# Patient Record
Sex: MALE | Race: WHITE | NOT HISPANIC OR LATINO | Employment: OTHER | URBAN - METROPOLITAN AREA
[De-identification: names, ages, dates, MRNs, and addresses within clinical notes are randomized per-mention and may not be internally consistent; named-entity substitution may affect disease eponyms.]

---

## 2018-02-27 ENCOUNTER — OFFICE VISIT CONVERTED (OUTPATIENT)
Dept: INTERNAL MEDICINE | Facility: CLINIC | Age: 58
End: 2018-02-27
Attending: INTERNAL MEDICINE

## 2018-10-30 ENCOUNTER — OFFICE VISIT CONVERTED (OUTPATIENT)
Dept: INTERNAL MEDICINE | Facility: CLINIC | Age: 58
End: 2018-10-30
Attending: INTERNAL MEDICINE

## 2018-10-30 ENCOUNTER — CONVERSION ENCOUNTER (OUTPATIENT)
Dept: INTERNAL MEDICINE | Facility: CLINIC | Age: 58
End: 2018-10-30

## 2019-10-30 ENCOUNTER — HOSPITAL ENCOUNTER (OUTPATIENT)
Dept: OTHER | Facility: HOSPITAL | Age: 59
Discharge: HOME | End: 2019-10-30
Attending: INTERNAL MEDICINE

## 2019-10-30 LAB
ALBUMIN SERPL-MCNC: 4.9 G/DL (ref 3.5–5)
ALBUMIN/GLOB SERPL: 2.2 {RATIO} (ref 1.4–2.6)
ALP SERPL-CCNC: 85 U/L (ref 56–119)
ALT SERPL-CCNC: 16 U/L (ref 10–40)
ANION GAP SERPL CALC-SCNC: 18 MMOL/L (ref 8–19)
AST SERPL-CCNC: 19 U/L (ref 15–50)
BASOPHILS # BLD AUTO: 0.03 10*3/UL (ref 0–0.2)
BASOPHILS NFR BLD AUTO: 0.5 % (ref 0–3)
BILIRUB SERPL-MCNC: 0.66 MG/DL (ref 0.2–1.3)
BUN SERPL-MCNC: 16 MG/DL (ref 5–25)
BUN/CREAT SERPL: 17 {RATIO} (ref 6–20)
CALCIUM SERPL-MCNC: 9.7 MG/DL (ref 8.7–10.4)
CHLORIDE SERPL-SCNC: 103 MMOL/L (ref 99–111)
CHOLEST SERPL-MCNC: 148 MG/DL (ref 107–200)
CHOLEST/HDLC SERPL: 3.4 {RATIO} (ref 3–6)
CONV ABS IMM GRAN: 0.01 10*3/UL (ref 0–0.2)
CONV CO2: 26 MMOL/L (ref 22–32)
CONV IMMATURE GRAN: 0.2 % (ref 0–1.8)
CONV TOTAL PROTEIN: 7.1 G/DL (ref 6.3–8.2)
CREAT UR-MCNC: 0.93 MG/DL (ref 0.7–1.2)
DEPRECATED RDW RBC AUTO: 37.9 FL (ref 35.1–43.9)
EOSINOPHIL # BLD AUTO: 0.2 10*3/UL (ref 0–0.7)
EOSINOPHIL # BLD AUTO: 3.3 % (ref 0–7)
ERYTHROCYTE [DISTWIDTH] IN BLOOD BY AUTOMATED COUNT: 11.6 % (ref 11.6–14.4)
GFR SERPLBLD BASED ON 1.73 SQ M-ARVRAT: >60 ML/MIN/{1.73_M2}
GLOBULIN UR ELPH-MCNC: 2.2 G/DL (ref 2–3.5)
GLUCOSE SERPL-MCNC: 101 MG/DL (ref 70–99)
HCT VFR BLD AUTO: 46 % (ref 42–52)
HDLC SERPL-MCNC: 43 MG/DL (ref 40–60)
HGB BLD-MCNC: 16.2 G/DL (ref 14–18)
LDLC SERPL CALC-MCNC: 82 MG/DL (ref 70–100)
LYMPHOCYTES # BLD AUTO: 2.57 10*3/UL (ref 1–5)
LYMPHOCYTES NFR BLD AUTO: 42.2 % (ref 20–45)
MCH RBC QN AUTO: 31.6 PG (ref 27–31)
MCHC RBC AUTO-ENTMCNC: 35.2 G/DL (ref 33–37)
MCV RBC AUTO: 89.8 FL (ref 80–96)
MONOCYTES # BLD AUTO: 0.54 10*3/UL (ref 0.2–1.2)
MONOCYTES NFR BLD AUTO: 8.9 % (ref 3–10)
NEUTROPHILS # BLD AUTO: 2.74 10*3/UL (ref 2–8)
NEUTROPHILS NFR BLD AUTO: 44.9 % (ref 30–85)
NRBC CBCN: 0 % (ref 0–0.7)
OSMOLALITY SERPL CALC.SUM OF ELEC: 295 MOSM/KG (ref 273–304)
PLATELET # BLD AUTO: 134 10*3/UL (ref 130–400)
PMV BLD AUTO: 9.5 FL (ref 9.4–12.4)
POTASSIUM SERPL-SCNC: 4.5 MMOL/L (ref 3.5–5.3)
RBC # BLD AUTO: 5.12 10*6/UL (ref 4.7–6.1)
SODIUM SERPL-SCNC: 142 MMOL/L (ref 135–147)
TRIGL SERPL-MCNC: 114 MG/DL (ref 40–150)
VLDLC SERPL-MCNC: 23 MG/DL (ref 5–37)
WBC # BLD AUTO: 6.09 10*3/UL (ref 4.8–10.8)

## 2019-10-31 ENCOUNTER — HOSPITAL ENCOUNTER (OUTPATIENT)
Dept: OTHER | Facility: HOSPITAL | Age: 59
Discharge: HOME | End: 2019-10-31
Attending: INTERNAL MEDICINE

## 2019-10-31 ENCOUNTER — OFFICE VISIT CONVERTED (OUTPATIENT)
Dept: INTERNAL MEDICINE | Facility: CLINIC | Age: 59
End: 2019-10-31
Attending: INTERNAL MEDICINE

## 2020-02-15 ENCOUNTER — HOSPITAL ENCOUNTER (OUTPATIENT)
Dept: OTHER | Facility: HOSPITAL | Age: 60
Discharge: HOME | End: 2020-02-15
Attending: INTERNAL MEDICINE

## 2020-02-15 LAB
ALBUMIN SERPL-MCNC: 5 G/DL (ref 3.5–5)
ALBUMIN/GLOB SERPL: 2 {RATIO} (ref 1.4–2.6)
ALP SERPL-CCNC: 76 U/L (ref 56–119)
ALT SERPL-CCNC: 16 U/L (ref 10–40)
ANION GAP SERPL CALC-SCNC: 13 MMOL/L (ref 8–19)
AST SERPL-CCNC: 19 U/L (ref 15–50)
BASOPHILS # BLD AUTO: 0.03 10*3/UL (ref 0–0.2)
BASOPHILS NFR BLD AUTO: 0.5 % (ref 0–3)
BILIRUB SERPL-MCNC: 0.83 MG/DL (ref 0.2–1.3)
BUN SERPL-MCNC: 18 MG/DL (ref 5–25)
BUN/CREAT SERPL: 17 {RATIO} (ref 6–20)
CALCIUM SERPL-MCNC: 9.8 MG/DL (ref 8.7–10.4)
CHLORIDE SERPL-SCNC: 100 MMOL/L (ref 99–111)
CONV ABS IMM GRAN: 0.01 10*3/UL (ref 0–0.2)
CONV CO2: 29 MMOL/L (ref 22–32)
CONV IMMATURE GRAN: 0.2 % (ref 0–1.8)
CONV TOTAL PROTEIN: 7.5 G/DL (ref 6.3–8.2)
CREAT UR-MCNC: 1.08 MG/DL (ref 0.7–1.2)
DEPRECATED RDW RBC AUTO: 38.3 FL (ref 35.1–43.9)
EOSINOPHIL # BLD AUTO: 0.23 10*3/UL (ref 0–0.7)
EOSINOPHIL # BLD AUTO: 4.2 % (ref 0–7)
ERYTHROCYTE [DISTWIDTH] IN BLOOD BY AUTOMATED COUNT: 11.8 % (ref 11.6–14.4)
GFR SERPLBLD BASED ON 1.73 SQ M-ARVRAT: >60 ML/MIN/{1.73_M2}
GLOBULIN UR ELPH-MCNC: 2.5 G/DL (ref 2–3.5)
GLUCOSE SERPL-MCNC: 105 MG/DL (ref 70–99)
HCT VFR BLD AUTO: 48.1 % (ref 42–52)
HGB BLD-MCNC: 16.9 G/DL (ref 14–18)
INR PPP: 1.07 (ref 2–3)
LYMPHOCYTES # BLD AUTO: 2.25 10*3/UL (ref 1–5)
LYMPHOCYTES NFR BLD AUTO: 41 % (ref 20–45)
MCH RBC QN AUTO: 31.7 PG (ref 27–31)
MCHC RBC AUTO-ENTMCNC: 35.1 G/DL (ref 33–37)
MCV RBC AUTO: 90.2 FL (ref 80–96)
MONOCYTES # BLD AUTO: 0.4 10*3/UL (ref 0.2–1.2)
MONOCYTES NFR BLD AUTO: 7.3 % (ref 3–10)
NEUTROPHILS # BLD AUTO: 2.57 10*3/UL (ref 2–8)
NEUTROPHILS NFR BLD AUTO: 46.8 % (ref 30–85)
NRBC CBCN: 0 % (ref 0–0.7)
OSMOLALITY SERPL CALC.SUM OF ELEC: 288 MOSM/KG (ref 273–304)
PLATELET # BLD AUTO: 150 10*3/UL (ref 130–400)
PMV BLD AUTO: 9.5 FL (ref 9.4–12.4)
POTASSIUM SERPL-SCNC: 4.3 MMOL/L (ref 3.5–5.3)
PROTHROMBIN TIME: 11.4 S (ref 9.4–12)
RBC # BLD AUTO: 5.33 10*6/UL (ref 4.7–6.1)
SODIUM SERPL-SCNC: 138 MMOL/L (ref 135–147)
WBC # BLD AUTO: 5.49 10*3/UL (ref 4.8–10.8)

## 2020-03-02 ENCOUNTER — OFFICE VISIT CONVERTED (OUTPATIENT)
Dept: GASTROENTEROLOGY | Facility: CLINIC | Age: 60
End: 2020-03-02
Attending: INTERNAL MEDICINE

## 2020-07-02 ENCOUNTER — TELEPHONE CONVERTED (OUTPATIENT)
Dept: UROLOGY | Facility: CLINIC | Age: 60
End: 2020-07-02
Attending: UROLOGY

## 2020-07-06 ENCOUNTER — HOSPITAL ENCOUNTER (OUTPATIENT)
Dept: PERIOP | Facility: HOSPITAL | Age: 60
Setting detail: HOSPITAL OUTPATIENT SURGERY
Discharge: HOME | End: 2020-07-06
Attending: UROLOGY

## 2020-07-14 ENCOUNTER — PROCEDURE VISIT CONVERTED (OUTPATIENT)
Dept: UROLOGY | Facility: CLINIC | Age: 60
End: 2020-07-14
Attending: UROLOGY

## 2020-07-23 LAB
COLOR STONE: NORMAL
COMPN STONE: NORMAL
CONV CA OXALATE DIHYDRATE: 20 %
CONV CA OXALATE MONOHYDRATE: 80 %
CONV CALCULI COMMENT: NORMAL
CONV CALCULI DISCLAIMER: NORMAL
CONV CALCULI NOTE: NORMAL
CONV PHOTO (CALCULI): NORMAL
SIZE STONE: NORMAL MM
WT STONE: 114 MG

## 2020-08-11 ENCOUNTER — HOSPITAL ENCOUNTER (OUTPATIENT)
Dept: ULTRASOUND IMAGING | Facility: HOSPITAL | Age: 60
Discharge: HOME | End: 2020-08-11
Attending: UROLOGY

## 2020-08-19 ENCOUNTER — TELEPHONE CONVERTED (OUTPATIENT)
Dept: UROLOGY | Facility: CLINIC | Age: 60
End: 2020-08-19
Attending: UROLOGY

## 2020-10-02 ENCOUNTER — HOSPITAL ENCOUNTER (OUTPATIENT)
Dept: GASTROENTEROLOGY | Facility: HOSPITAL | Age: 60
Discharge: HOME | End: 2020-10-02
Attending: NURSE PRACTITIONER

## 2020-10-02 ENCOUNTER — HOSPITAL ENCOUNTER (OUTPATIENT)
Dept: ULTRASOUND IMAGING | Facility: HOSPITAL | Age: 60
Discharge: HOME | End: 2020-10-02
Attending: INTERNAL MEDICINE

## 2020-11-10 ENCOUNTER — HOSPITAL ENCOUNTER (OUTPATIENT)
Dept: OTHER | Facility: HOSPITAL | Age: 60
Discharge: HOME | End: 2020-11-10
Attending: INTERNAL MEDICINE

## 2020-11-10 ENCOUNTER — OFFICE VISIT CONVERTED (OUTPATIENT)
Dept: INTERNAL MEDICINE | Facility: CLINIC | Age: 60
End: 2020-11-10
Attending: INTERNAL MEDICINE

## 2020-11-10 LAB
EST. AVERAGE GLUCOSE BLD GHB EST-MCNC: 108 MG/DL
HBA1C MFR BLD: 5.4 % (ref 3.5–5.7)

## 2020-11-11 LAB
ALBUMIN SERPL-MCNC: 5 G/DL (ref 3.5–5)
ALBUMIN/GLOB SERPL: 1.8 {RATIO} (ref 1.4–2.6)
ALP SERPL-CCNC: 76 U/L (ref 56–119)
ALT SERPL-CCNC: 21 U/L (ref 10–40)
ANION GAP SERPL CALC-SCNC: 14 MMOL/L (ref 8–19)
AST SERPL-CCNC: 28 U/L (ref 15–50)
BASOPHILS # BLD AUTO: 0.02 10*3/UL (ref 0–0.2)
BASOPHILS NFR BLD AUTO: 0.4 % (ref 0–3)
BILIRUB SERPL-MCNC: 0.8 MG/DL (ref 0.2–1.3)
BUN SERPL-MCNC: 15 MG/DL (ref 5–25)
BUN/CREAT SERPL: 15 {RATIO} (ref 6–20)
CALCIUM SERPL-MCNC: 9.8 MG/DL (ref 8.7–10.4)
CHLORIDE SERPL-SCNC: 101 MMOL/L (ref 99–111)
CHOLEST SERPL-MCNC: 149 MG/DL (ref 107–200)
CHOLEST/HDLC SERPL: 3.6 {RATIO} (ref 3–6)
CONV ABS IMM GRAN: 0.01 10*3/UL (ref 0–0.2)
CONV CO2: 29 MMOL/L (ref 22–32)
CONV IMMATURE GRAN: 0.2 % (ref 0–1.8)
CONV TOTAL PROTEIN: 7.8 G/DL (ref 6.3–8.2)
CREAT UR-MCNC: 1.02 MG/DL (ref 0.7–1.2)
DEPRECATED RDW RBC AUTO: 38.4 FL (ref 35.1–43.9)
EOSINOPHIL # BLD AUTO: 0.17 10*3/UL (ref 0–0.7)
EOSINOPHIL # BLD AUTO: 3.1 % (ref 0–7)
ERYTHROCYTE [DISTWIDTH] IN BLOOD BY AUTOMATED COUNT: 11.8 % (ref 11.6–14.4)
GFR SERPLBLD BASED ON 1.73 SQ M-ARVRAT: >60 ML/MIN/{1.73_M2}
GLOBULIN UR ELPH-MCNC: 2.8 G/DL (ref 2–3.5)
GLUCOSE SERPL-MCNC: 89 MG/DL (ref 70–99)
HCT VFR BLD AUTO: 46.2 % (ref 42–52)
HDLC SERPL-MCNC: 41 MG/DL (ref 40–60)
HGB BLD-MCNC: 15.9 G/DL (ref 14–18)
LDLC SERPL CALC-MCNC: 67 MG/DL (ref 70–100)
LYMPHOCYTES # BLD AUTO: 2.02 10*3/UL (ref 1–5)
LYMPHOCYTES NFR BLD AUTO: 36.6 % (ref 20–45)
MCH RBC QN AUTO: 30.9 PG (ref 27–31)
MCHC RBC AUTO-ENTMCNC: 34.4 G/DL (ref 33–37)
MCV RBC AUTO: 89.9 FL (ref 80–96)
MONOCYTES # BLD AUTO: 0.4 10*3/UL (ref 0.2–1.2)
MONOCYTES NFR BLD AUTO: 7.2 % (ref 3–10)
NEUTROPHILS # BLD AUTO: 2.9 10*3/UL (ref 2–8)
NEUTROPHILS NFR BLD AUTO: 52.5 % (ref 30–85)
NRBC CBCN: 0 % (ref 0–0.7)
OSMOLALITY SERPL CALC.SUM OF ELEC: 290 MOSM/KG (ref 273–304)
PLATELET # BLD AUTO: 153 10*3/UL (ref 130–400)
PMV BLD AUTO: 10 FL (ref 9.4–12.4)
POTASSIUM SERPL-SCNC: 4.4 MMOL/L (ref 3.5–5.3)
RBC # BLD AUTO: 5.14 10*6/UL (ref 4.7–6.1)
SODIUM SERPL-SCNC: 140 MMOL/L (ref 135–147)
TRIGL SERPL-MCNC: 207 MG/DL (ref 40–150)
TSH SERPL-ACNC: 2.22 M[IU]/L (ref 0.27–4.2)
VLDLC SERPL-MCNC: 41 MG/DL (ref 5–37)
WBC # BLD AUTO: 5.52 10*3/UL (ref 4.8–10.8)

## 2020-11-23 ENCOUNTER — OFFICE VISIT CONVERTED (OUTPATIENT)
Dept: GASTROENTEROLOGY | Facility: CLINIC | Age: 60
End: 2020-11-23
Attending: INTERNAL MEDICINE

## 2021-05-10 NOTE — H&P
History and Physical      Patient Name: Angel Velasco   Patient ID: [id number]   Sex: Male   YOB: 1960    Primary Care Provider: Ana Ho MD   Referring Provider: Ana Ho MD    Visit Date: July 2, 2020    Provider: Laura Sinclair MD   [facility]   Location Address: [address] [place] [address]  [id number]   Location Phone: [phone]          Chief Complaint     Presents with urologic concern    Telephone Visit- presents for evaluation via telephone.   Verbal consent obtained before beginning visit.   The following staff were present during this visit: Larissa Rosario MA  Total time for encounter: 22 minutes       History Of Present Illness  [facility]  Outpatient History and Physical Surgical Orders    Which Facility: [facility] Surgery Date: 07/06/2020 Preadmission Testing Date: 7/2/2020   Patient's Name: Angel Velasco YOB: 1960   Chief complaint/history present illness: Left flank pain, ureteral stone   Current Medication List:   Allergies: doxycycline hyclate and PENICILLINS   Significant past medical: Allergic rhinitis due to allergen, Chronic hepatitis C without hepatic coma, Chronic liver disease, Colitis, Diverticulitis, Elevated blood sugar, Hepatitis, Reye syndrome, and Sinus trouble   Past Surgical History: *Other, Colonoscopy, Liver biopsy, and Scalp Surgery   Examination of heart and lungs: Regular rate, rhythm, no murmur, gallop, rub, Breath sounds normal, no distress, and Abdomen soft, non-tender, BSx4 are positive        60 gentleman presents for further evaluation of ureteral stone after presentation to the ER for severe, acute, stabbing left sided flank pain which radiated to his left groin and abdomen. Patient states that pain was exacerbated by walking and alleviated with rest. Also notes associated nausea; denies fever. CT performed in the ER, 7/2/2020, indicated a left proximal ureteral  calculi 9 mm.   His symptoms were controlled and he was discharged home in anticipation of urologic consultation.  Wife is CRNA at [employer].       Past Medical History  Disease Name Date Onset Notes   Allergic rhinitis due to allergen 10/24/2016 well controlled   Chronic hepatitis C without hepatic coma 10/24/2016 --    Chronic liver disease --  --    Colitis --  --    Diverticulitis --  --    Elevated blood sugar --  --    Hepatitis --  --    Reye syndrome --  --    Sinus trouble --  --          Past Surgical History  Procedure Name Date Notes   *Other  sigmoid colectomy   Colonoscopy 2009, 2012 --    Liver biopsy --  --    Scalp Surgery 1966 excision of lesion         Medication List  Name Date Started Instructions   Flonase Allergy Relief 50 mcg/actuation nasal spray,suspension  inhale 1 spray (50 mcg) in each nostril by intranasal route once daily   ibuprofen 800 mg oral tablet 10/31/2019 take 1 tablet (800 mg) by oral route 3 times per day for 5 days   ketorolac 10 mg oral tablet 07/02/2020 take 1 tablet (10 mg) by oral route every 6 hours as needed not to exceed 40 mg in 24hrs for 30 days   milk thistle oral  --    multivitamin oral tablet  take 1 tablet by oral route daily   Singulair 10 mg oral tablet 10/30/2018 take 1 tablet (10 mg) by oral route once daily in the evening for 90 days   Zyrtec 10 mg oral tablet  take 1 tablet (10 mg) by oral route once daily         Allergy List  Allergen Name Date Reaction Notes   doxycycline hyclate --  --  --    PENICILLINS --  --  --        Allergies Reconciled  Family Medical History  Disease Name Relative/Age Notes   Stroke  Grandparents   Heart Disease Father/   --    Diabetes  Grandparents         Social History  Finding Status Start/Stop Quantity Notes   Alcohol Never --/-- --  --    Tobacco Never --/-- --  --          Review of Systems  · Constitutional  o Denies  o : chills, fever  · Respiratory  o Denies  o : shortness of breath, cough          Results      [facility]      PACS RADIOLOGY REPORT    Patient: ILEANA RENTERIA Acct: #[id number] Report: #[id number]    UNIT #: [id number]  DOS: 20 0738  INSURANCE:BLUE CROSS [facility] ORDER #:[id number]  LOCATION:ER   : 1960    PROVIDERS  ADMITTING:   ATTENDING:   FAMILY:  MARITZA BELTRÁN ORDERING:  AMBER PETERSON   OTHER:  DICTATING:  Chidi Mike MD    REQ #:[id number]   EXAM:ABDPELWO - CT ABDOMEN PELVIS wo CONTRAST  REASON FOR EXAM:  Abdominal Pain  REASON FOR VISIT:  LT FLANK PN DOWN TO GROIN    *******Signed******     PROCEDURE: CT ABDOMEN PELVIS WITHOUT CONTRAST     COMPARISON: None.     INDICATIONS: GENERALIZED LEFT SIDED ABDOMEN AND LEFT FLANK PAIN     TECHNIQUE: CT images were created without intravenous contrast.       PROTOCOL:   Standard imaging protocol performed      RADIATION:   DLP: 388.2mGy*cm    Automated exposure control was utilized to minimize radiation dose.      FINDINGS:   The lung bases are clear bilaterally.     The liver, gallbladder, spleen, pancreas and adrenal glands appear unremarkable.  A 0.2 cm   nonobstructing right renal stone is noted.  The right kidney otherwise appears unremarkable.  On   the left, there is a 0.9 cm stone in the proximal ureter just distal to the UPJ.  This produces   mild hydronephrosis.  There is also a nonobstructing 0.4 cm stone in the lower pole of the kidney.     Several sub cm lymph nodes are noted in the portal triad region and retroperitoneum.  No free fluid   is seen in the abdomen or pelvis.     The prostate appears mildly enlarged measuring 4.8 cm x 3.8 cm.  The urinary bladder and seminal   vesicles appear normal.     Postsurgical changes are noted at the rectosigmoid junction.  Mild colonic diverticulosis is noted.    A surgical clip is noted near the cecum presumably secondary to a previous appendectomy.     Sclerotic foci are noted in the spine, osseous pelvis and proximal femurs,  favored to represent   bone islands.  There is a pars defect on the right at L5 osseous structures otherwise appear   unremarkable.     CONCLUSION:   1. 0.9 cm stone in the proximal left ureter producing mild hydronephrosis  2. Other small nonobstructing bilateral renal stones  3. Mild prostatomegaly  4. Postoperative changes of the rectosigmoid and previous appendectomy            Chidi Mike M.D.         Electronically Signed and Approved By: Chidi Mike M.D. on 7/02/2020 at 8:45            Until signed, this is an unconfirmed preliminary report that may contain  errors and is subject to change.                WURRO:  D:07/02/20 0845         Assessment  · Pre-Surgical Orders     V72.84  · Ureteral stone with hydronephrosis       Hydronephrosis with renal and ureteral calculous obstruction     592.1/N13.2    Problems Reconciled  Plan  · Orders  o General Urology Surgery Order ([facility]) - V72.84, 592.1/N13.2 - 07/06/2020  o Physician Telephone Evaluation, 21-30 minutes (00092) - 592.1/N13.2 - 07/02/2020  · Medications  o Medications have been Reconciled  o Transition of Care or Provider Policy  · Instructions  o Pre-Operative Orders: Sign permit for cystoscopy, left retrograde pyelogram, ureteroscopy, laser lithotripsy, and stent  o Outpatient   o Levaquin 500 mg IV OCTOR.  o RISK AND BENEFITS:  o Possible risks/complications, benefits and alternatives to surgical or invasive procedure have been explained to patient and/or legal guardian.  o Electronically Identified Patient Education Materials Provided Electronically     COVID- test performed in ER, 7/2/20    ER records, labs and imaging reviewed; discussed with patient at length  Unlikely to pass left ureteral proximal stone of 9 mm without surgical intervention.  Recommend hyperhydration, toradol prn, and Flomax  Recommend arranging definitive outpatient stone management via ureteroscopy laser lithotripsy    Counseled to return to ER immeadiately if fever,  intolerance to PO; persistent emesis, or uncontrollable pain    Plan for outpatient definitive stone management via cystoscopy, left retrograde pyelogram, ureteroscopy, laser lithotripsy, and stent placement  Risks, benefits, and alternatives discussed with patient at length including but not limited to bleeding, infection, damage to surrounding structures, pain, and the need for further procedures; notes understanding of this and is agreeable.   Patient also notes understanding that if unable to reach the level of the stone or if significant purulent discharge noted upon initiation of procedure that stent will be placed in definitive stone management will be deferred until the collecting system is optimized.  Will schedule.  All questions addressed                 Electronically Signed by: Laura Sinclair MD -Author on July 2, 2020 01:27:42 PM

## 2021-05-10 NOTE — PROCEDURES
Procedure Note      Patient Name: Angel Velasco   Patient ID: [id number]   Sex: Male   YOB: 1960    Primary Care Provider: Ana Ho MD   Referring Provider: Ana Ho MD    Visit Date: July 14, 2020    Provider: Laura Sinclair MD   [facility]   Location Address: [address] [place] [address]  [id number]   Location Phone: [phone]          Cystoscopy with Stent Removal  Pre-Procedure Diagnosis: Left ureteral stone status post ureteroscopy, laser lithotripsy, and stent   Post-Procedural Diagnosis: Same   Procedure Performed: Cystoscopy with Ureteral Stent Removal   Description of the Procedure:  Angel Velasco was appropriately identified and brought to the cystoscopy suite. A timeout was undertaken documenting the correct patient, site, and procedure. The patient was prepped in a normal sterile fashion. A flexible cystoscope was then introduced into the bladder. The stent was identified and grasped with endoscopic graspers. The entire stent was removed and passed off the field. Patient tolerated the procedure well. There were no intraprocedural complications.           Assessment  · Hydronephrosis     591/N13.30    Problems Reconciled  Plan  · Orders  o Cystoscopy with Stent Removal (50806) - - 07/14/2020  o Renal Ultrasound-bilateral (23751, 12397) - 591/N13.30 - 08/11/2020  · Medications  o Medications have been Reconciled  o Transition of Care or Provider Policy  · Instructions  o Electronically Identified Patient Education Materials Provided Electronically     Tolerated procedure well.  Instructions provided.  Patient to follow-up in 4-6 weeks with renal ultrasound prior  All questions addressed             Electronically Signed by: Laura Sinclair MD -Author on July 14, 2020 06:14:01 PM

## 2021-05-13 NOTE — PROGRESS NOTES
Progress Note      Patient Name: Angel Velasco   Patient ID: [id number]   Sex: Male   YOB: 1960    Primary Care Provider: Ana Ho MD   Referring Provider: Ana Ho MD    Visit Date: November 23, 2020    Provider: Niyah Durán MD   Location[facility]   Location Address: [address] [place] [address], [address]  [place], [place]  [id number]   Location Phone: [phone]          Chief Complaint  · Follow up Cirrhosis      History Of Present Illness     Mr. Velasco is a 60 year old male with history of chronic HCV s/p 24 weeks Harvoni with SVR who presents for f/u.  Doing well since last visit.  Did have episode with nephrolithiasis that require stent.  Now doing better.  No issues with abdominal pain, nausea, vomiting.  Weight stable.       Labs (2/15/2020): WBC 5.4, hemoglobin 16.9, platelets 150, AST 19, ALT 16, alk phos 76, total bili 0.8, INR 1.07.    FibroScan (10/2/20): S0/F0.  Right upper quadrant (10/2/2020): Liver appears normal in size and echotexture, no liver lesions, cholelithiasis.    EGD (10/25/2017): Normal esophagus, small hiatal hernia, normal stomach, normal duodenum.    Colonoscopy (10/25/2017): Moderate pandiverticulosis, evidence of previous colocolonic anastomosis in the sigmoid colon.  1 cm ascending colon polyp removed (sessile serrated), 3 mm sigmoid colon polyp removed (hyperplastic), grade 1 internal hemorrhoids.  Repeat recommended in 5 years.              Past Medical History  Allergic rhinitis due to allergen; Chronic hepatitis C without hepatic coma; Chronic liver disease; Colitis; Diverticulitis; Elevated blood sugar; Hepatitis; Reye syndrome; Sinus trouble         Past Surgical History  *Other; Colonoscopy; Cystoscopy and ureteroscopy, with stent placement; Liver biopsy; Scalp Surgery         Medication List  Flonase Allergy Relief 50 mcg/actuation nasal spray,suspension; ibuprofen 800 mg oral tablet; milk thistle oral;  multivitamin oral tablet; Singulair 10 mg oral tablet; Zyrtec 10 mg oral tablet         Allergy List  doxycycline hyclate; PENICILLINS       Allergies Reconciled  Family Medical History  Stroke; Heart Disease; Diabetes         Social History  Alcohol (Never); Tobacco (Never)         Review of Systems  · Constitutional  o Denies  o : chills, fever  · Cardiovascular  o Denies  o : chest pain  · Respiratory  o Denies  o : shortness of breath  · Gastrointestinal  o Admits  o : see HPI   · Endocrine  o Denies  o : weight gain, weight loss      Vitals  Date Time BP Position Site L\R Cuff Size HR RR TEMP (F) WT  HT  BMI kg/m2 BSA m2 O2 Sat FR L/min FiO2 HC       11/23/2020 11:41 /76 Sitting    64 - R  96.9 179lbs 2oz 6'   24.29 2.03             Physical Examination  · Constitutional  o Appearance  o : Healthy-appearing, awake and alert in no acute distress  · Head and Face  o Head  o : Normocephalic with no worriesome skin lesions  · Eyes  o Vision  o :   § Visual Fields  § : eyes move symmetrical in all directions  o Sclerae  o : sclerae anicteric  · Neck  o Inspection/Palpation  o : Trachea is midline, no adenopathy  · Respiratory  o Respiratory Effort  o : Breathing is unlabored.  o Inspection of Chest  o : normal appearance  o Auscultation of Lungs  o : Chest is clear to auscultation bilaterally.  · Cardiovascular  o Heart  o :   § Auscultation of Heart  § : no murmurs, rubs, or gallops  o Peripheral Vascular System  o :   § Extremities  § : no cyanosis, clubbing or edema;   · Gastrointestinal  o Abdominal Examination  o : Abdomen is soft, nontender to palpation, with normal active bowel sounds, no appreciable hepatosplenomegaly.          Assessment  · Cholelithiasis     574.20/K80.20  · Hepatitis C antibody test positive     795.79/R76.8    Problems Reconciled  Plan  · Medications  o Medications have been Reconciled  o Transition of Care or Provider Policy  · Instructions  o Given most recent RUQ US showing  normal liver and Fibroscan with F0 (no fibrosis), low suspicion for cirrhosis. Would not recommend serial RUQ US every 6 months since no signs of cirrhosis on most recent imaging/diagnostic tests. Previous dx was based on a Fibrosure. Fibroscan would correlate more closely with liver bx results. Pt reports last liver bx about 5 years ago that showed no fibrosis.   o Pt would like to f/u in 1 year.  · Disposition  o Follow up in 1 year            Electronically Signed by: Niyah Durán MD -Author on November 23, 2020 03:44:45 PM

## 2021-05-13 NOTE — PROGRESS NOTES
Progress Note      Patient Name: Angel Velasco   Patient ID: [id number]   Sex: Male   YOB: 1960    Primary Care Provider: Ana Ho MD   Referring Provider: Ana Ho MD    Visit Date: August 19, 2020    Provider: Laura Sinclair MD   [facility]   Location Address: [address] [place] [address]  [id number]   Location Phone: [phone]          Chief Complaint  · Pt is here for urological concerns     Telephone Visit- presents for evaluation via telephone.   Verbal consent obtained before beginning visit.   The following staff were present during this visit: Larissa Rosario MA  Total time for encounter: 12 minutes       History Of Present Illness     60-year-old gentleman s for follow-up status post left ureteroscopy laser lithotripsy for ureteral calculi, 7/6/20.  Patient subsequently underwent stent removal in office and presents with renal ultrasound prior to today's appointment. Patient states that he has been doing well since his procedure.  Currently denies any urinary symptoms.  Denies hematuria or flank pain.  No complaints today.    Chemical analysis of stone, 7/6/2020: 100% calcium oxalate    Renal ultrasound 8/11/2020: no evidence for hydronephrosis.  Previously seen left-sided stones are not visualized on ultrasound.  Punctate right-sided nephrolith not seen on ultrasound.         Past Medical History  Allergic rhinitis due to allergen; Chronic hepatitis C without hepatic coma; Chronic liver disease; Colitis; Diverticulitis; Elevated blood sugar; Hepatitis; Reye syndrome; Sinus trouble         Past Surgical History  *Other; Colonoscopy; Cystoscopy and ureteroscopy, with stent placement; Liver biopsy; Scalp Surgery         Medication List  Flonase Allergy Relief 50 mcg/actuation nasal spray,suspension; ibuprofen 800 mg oral tablet; milk thistle oral; multivitamin oral tablet; Singulair 10 mg oral tablet; Zyrtec 10 mg oral tablet         Allergy  List  doxycycline hyclate; PENICILLINS       Allergies Reconciled  Family Medical History  Stroke; Heart Disease; Diabetes         Social History  Alcohol (Never); Tobacco (Never)         Immunizations  Name Date Admin   Influenza    Influenza          Review of Systems  · Constitutional  o Denies  o : chills, fever  · Gastrointestinal  o Denies  o : nausea, vomiting, diarrhea          Results       [facility]      PACS RADIOLOGY REPORT    Patient: ILEANA RENTERIA Acct: #[id number] Report: #[id number]    UNIT #: [id number]  DOS: 20 1116  INSURANCE:BLUE CROSS [facility] ORDER #: [id number]  LOCATION:   : 1960    PROVIDERS  ADMITTING:   ATTENDING: CARA LUCIO  FAMILY:  MARITZA BELTRÁN ORDERING:  CARA LUCIO   OTHER:  DICTATING:  Kahlil Marshall MD    REQ #:[id number]   EXAM:CRETR - COMPLETE RETROPERI BRAN KIDNEYS  REASON FOR EXAM:  HYDRONEPHROSIS  REASON FOR VISIT:  HYDRONEPHROSIS    *******Signed******     PROCEDURE: U/S BILATERAL  KIDNEYS     COMPARISON: [facility], CT, ABD PEL W/O CONTRAST, 2020, 8:22.  [facility], US, US ABDOMEN LIMITED, 2017, 7:23.     INDICATIONS: HYDRONEPHROSIS     TECHNIQUE: Ultrasound examination of the kidneys and bladder was performed.       FINDINGS:   Right kidney measures 10.7 centimeters left kidney measures 10.9 centimeters in length.  Bladder is   distended.  Ureteral jets were not visualized.     Right kidney demonstrates no significant hydronephrosis or focal lesion.  Left kidney demonstrates   no significant hydronephrosis the.  There is a 1.7 centimeter cyst in lower left kidney with a   punctate calcification or echogenic focus.     CONCLUSION:   1. No evidence for hydronephrosis.  Previously seen left-sided stones are not visualized on   ultrasound.  Punctate right-sided nephrolith not seen on ultrasound.  2. There is a cyst in the lower left kidney  measuring up to 1.7 centimeters with a punctate   echogenic focus or calcification dependently.  3. Ureteral jets were not visualized.            NURIA SALAZAR MD         Electronically Signed and Approved By: NURIA SALAZAR MD on 8/11/2020 at 12:30                     Until signed, this is an unconfirmed preliminary report that may contain  errors and is subject to change.                SCHJO:  D:08/11/20 1230         Assessment  · Calcium oxalate calculus     275.49/E83.59    Problems Reconciled  Plan  · Orders  o Physician Telephone Evaluation, 11-20 minutes (66357) - 275.49/E83.59 - 08/19/2020  · Medications  o Medications have been Reconciled  o Transition of Care or Provider Policy  · Instructions  o Electronically Identified Patient Education Materials Provided Electronically     Renal ultrasound imaging reviewed and discussed with patient at length, no hydronephrosis, or evidence of residual stone.  No further intervention necessary at this time. Consider patient stone free at this time.     Calcium oxalate stone- Discussed dietary modifications for prevention of stone growth and recurrence including increased hydration, decrease sodium, normal calcium, low animal protein diet.    Informational handouts to be mailed to patient  All questions addressed  Patient to follow-up as needed                 Electronically Signed by: Laura Sinclair MD -Author on August 19, 2020 02:32:36 PM

## 2021-05-13 NOTE — PROGRESS NOTES
Progress Note      Patient Name: Angel Velasco   Patient ID: [id number]   Sex: Male   YOB: 1960    Primary Care Provider: Ana Ho MD   Referring Provider: Ana Ho MD    Visit Date: November 10, 2020    Provider: Ana Ho MD   Location: [facility]   Location Address: [address] [place] [address]  [id number]   Location Phone: [phone]          Chief Complaint  · Adult General Male Physical Exam      History Of Present Illness  Angel Velasco is a 60 year old /White male who presents for evaluation and treatment of:      chronic issues    GI-fatty liver with history of hep C  recent ultrasound    Cardio-denies chest pain, shortness of breath, or swelling in legs    Recently with kidney stones for which he has seen urology and feels he is doing well with    Overall feels well           Past Medical History  Disease Name Date Onset Notes   Allergic rhinitis due to allergen 10/24/2016 well controlled   Chronic hepatitis C without hepatic coma 10/24/2016 --    Chronic liver disease --  --    Colitis --  --    Diverticulitis --  --    Elevated blood sugar --  --    Hepatitis --  --    Reye syndrome --  --    Sinus trouble --  --          Past Surgical History  Procedure Name Date Notes   *Other  sigmoid colectomy   Colonoscopy 2009, 2012 --    Cystoscopy and ureteroscopy, with stent placement --  --    Liver biopsy --  --    Scalp Surgery 1966 excision of lesion         Medication List  Name Date Started Instructions   Flonase Allergy Relief 50 mcg/actuation nasal spray,suspension  inhale 1 spray (50 mcg) in each nostril by intranasal route once daily   ibuprofen 800 mg oral tablet 10/31/2019 take 1 tablet (800 mg) by oral route 3 times per day for 5 days   milk thistle oral  --    multivitamin oral tablet  take 1 tablet by oral route daily   Singulair 10 mg oral tablet 10/30/2018 take 1 tablet (10 mg) by oral route once  daily in the evening for 90 days   Zyrtec 10 mg oral tablet  take 1 tablet (10 mg) by oral route once daily         Allergy List  Allergen Name Date Reaction Notes   doxycycline hyclate --  --  --    PENICILLINS --  --  --        Allergies Reconciled  Family Medical History  Disease Name Relative/Age Notes   Stroke  Grandparents   Heart Disease Father/   --    Diabetes  Grandparents         Social History  Finding Status Start/Stop Quantity Notes   Alcohol Never --/-- --  --    Tobacco Never --/-- --  --          Immunizations  NameDate Admin Mfg Trade Name Lot Number Route Inj VIS Given VIS Publication   Hepatitis A11/10/2020 SKB HAVRIX-ADULT [id number] IM LD 11/10/2020    Comments: patient tolerated well, left office in stable condition   Hwdctquse01/02/2020 SEQ Fluarix, quadrivalent, preservative free [id number] NE NE 11/10/2020    Comments:    Tdap11/10/2020 SKB BOOSTRIX [id number] IM RD 11/10/2020    Comments: patient tolerated well, left office in stable condition         Review of Systems  · Constitutional  o Denies  o : chills, fever, fatigue, night sweats, weight loss, weight gain, loss of appetite  · Cardiovascular  o Denies  o : chest Pain, exertional dyspnea, lower extremity edema  · Respiratory  o Denies  o : shortness of breath, wheezing, frequent cough  · Gastrointestinal  o Denies  o : nausea, vomiting, diarrhea, constipation, reflux, abdominal pain, Blood in Stools  · Genitourinary  o Denies  o : urinary urgency, urinary frequency, dysuria, nocturia, hematuria, incontinence, difficulty voiding, urinary hesitancy, change in urine stream, post-void dribbling, decreased libido  · Integument  o Denies  o : rash  · Neurologic  o Denies  o : headache  · Psychiatric  o Denies  o : anxiety, depression      Vitals  Date Time BP Position Site L\R Cuff Size HR RR TEMP (F) WT  HT  BMI kg/m2 BSA m2 O2 Sat FR L/min FiO2 HC       10/30/2018 01:07 /72 Sitting    74 - R 16 98.1 183lbs 0oz 6'   24.82 2.05 98 %      10/31/2019  10:18 /72 Sitting    67 - R  97.4 183lbs 0oz 6'   24.82 2.05 99 %      03/02/2020 09:49 /80 Sitting    68 - R   181lbs 0oz 6'   24.55 2.04       11/10/2020 01:29 /76 Sitting    78 - R  97.6 180lbs 2oz 6'   24.43 2.04 97 %  21%          Physical Examination  · Constitutional  o Appearance  o : no acute distress, well-nourished  · Head and Face  o Head  o :   § Inspection  § : atraumatic, normocephalic  · Eyes  o Eyes  o : extraocular movements intact, no scleral icterus, no conjunctival injection  · Ears, Nose, Mouth and Throat  o Ears  o :   § External Ears  § : normal  o Nose  o :   § Intranasal Exam  § : nares patent  o Oral Cavity  o :   § Oral Mucosa  § : moist mucous membranes  · Respiratory  o Respiratory Effort  o : breathing comfortably, symmetric chest rise  o Auscultation of Lungs  o : clear to asculatation bilaterally, no wheezes, rales, or rhonchii  · Cardiovascular  o Heart  o :   § Auscultation of Heart  § : regular rate and rhythm, no murmurs, rubs, or gallops  o Peripheral Vascular System  o :   § Extremities  § : no edema  · Neurologic  o Mental Status Examination  o :   § Orientation  § : grossly oriented to person, place and time  o Gait and Station  o :   § Gait Screening  § : normal gait  · Psychiatric  o General  o : normal mood and affect          Assessment  · Chronic liver disease     571.9/K76.9  · General Medical Exam, Adult (CPE)     V70.0/Z00.00  · Screening for depression     V79.0/Z13.89  · Need for hepatitis A vaccination     V05.3/Z23  · Need for tetanus booster     V03.7/Z23  · Annual physical exam     V70.0/Z00.00  · Impaired fasting glucose     790.21/R73.01       Doing very well from a chronic illness standpoint  Will check labs and adjust meds as needed based on result  Up-to-date on vaccines other than will give hep A and Tdap today     Problems Reconciled  Plan  · Orders  o ACO-18: Negative screen for clinical depression using a standardized tool () -  V79.0/Z13.89 - 11/10/2020  o Havrix Adult Vaccine (18 yrs and older) (78004) - V05.3/Z23 - 11/10/2020   Vaccine - Hepatitis A; Dose: .5; Site: Left Deltoid; Route: Intramuscular; Date: 11/10/2020 14:34:00; Exp: 06/14/2022; Lot: [id number]; Mfg: Predilytics; TradeName: HAVRIX-ADULT; Administered By: Gia Subramanian RTR; Comment: patient tolerated well, left office in stable condition  o TDaP Vaccine - Age 7+ (24964) - V03.7/Z23 - 11/10/2020   Vaccine - Tdap; Dose: .5; Site: Right Deltoid; Route: Intramuscular; Date: 11/10/2020 14:32:00; Exp: 10/08/2022; Lot: [id number]; Mfg: Predilytics; TradeName: BOOSTRIX; Administered By: Gia Subramanian RTR; Comment: patient tolerated well, left office in stable condition  o Physical, Primary Care Panel (CBC, CMP, Lipid, TSH) [facility] (44660, 50016, 68053, 29619) - V70.0/Z00.00 - 11/10/2020  o Hgb A1c [facility] (50239) - 790.21/R73.01 - 11/10/2020  o Immunization Admin Fee (2+ Injections) ([facility]) (67334) - V05.3/Z23, V03.7/Z23 - 11/10/2020  o ACO-39: Current medications updated and reviewed (9279F, ) - - 11/10/2020  o ACO-14: Influenza immunization administered or previously received [facility] () - - 11/10/2020  o ACO-19: Colorectal cancer screening results documented and reviewed (3901F) - - 11/10/2020  · Medications  o Medications have been Reconciled  o Transition of Care or Provider Policy  · Instructions  o Depression Screen completed and scanned into the EMR under the designated folder within the patient's documents.  o Today's PHQ-9 result is __0_  o Discussed with patient the need for tetanus vaccination.   o Reviewed health maintenance flowsheet and updated information. Orders were placed and/or patient's response was documented.  o Patient was educated/instructed on their diagnosis, treatment and medications prior to discharge from the clinic today.            Electronically Signed by: Ana Ho MD -Author on November 15, 2020 05:00:57 PM

## 2021-05-14 VITALS
HEIGHT: 72 IN | OXYGEN SATURATION: 97 % | WEIGHT: 180.12 LBS | HEART RATE: 78 BPM | SYSTOLIC BLOOD PRESSURE: 110 MMHG | DIASTOLIC BLOOD PRESSURE: 76 MMHG | TEMPERATURE: 97.6 F | BODY MASS INDEX: 24.4 KG/M2

## 2021-05-14 VITALS
WEIGHT: 179.12 LBS | SYSTOLIC BLOOD PRESSURE: 119 MMHG | HEART RATE: 64 BPM | HEIGHT: 72 IN | DIASTOLIC BLOOD PRESSURE: 76 MMHG | TEMPERATURE: 96.9 F | BODY MASS INDEX: 24.26 KG/M2

## 2021-05-15 VITALS
DIASTOLIC BLOOD PRESSURE: 72 MMHG | HEIGHT: 72 IN | BODY MASS INDEX: 24.79 KG/M2 | OXYGEN SATURATION: 99 % | SYSTOLIC BLOOD PRESSURE: 112 MMHG | TEMPERATURE: 97.4 F | WEIGHT: 183 LBS | HEART RATE: 67 BPM

## 2021-05-15 VITALS
WEIGHT: 181 LBS | HEART RATE: 68 BPM | HEIGHT: 72 IN | DIASTOLIC BLOOD PRESSURE: 80 MMHG | BODY MASS INDEX: 24.52 KG/M2 | SYSTOLIC BLOOD PRESSURE: 121 MMHG

## 2021-05-16 VITALS
WEIGHT: 183 LBS | OXYGEN SATURATION: 98 % | BODY MASS INDEX: 24.79 KG/M2 | HEART RATE: 74 BPM | SYSTOLIC BLOOD PRESSURE: 101 MMHG | DIASTOLIC BLOOD PRESSURE: 72 MMHG | HEIGHT: 72 IN | RESPIRATION RATE: 16 BRPM | TEMPERATURE: 98.1 F

## 2021-05-16 VITALS
HEIGHT: 72 IN | DIASTOLIC BLOOD PRESSURE: 92 MMHG | BODY MASS INDEX: 24.28 KG/M2 | WEIGHT: 179.25 LBS | RESPIRATION RATE: 16 BRPM | TEMPERATURE: 97.5 F | SYSTOLIC BLOOD PRESSURE: 122 MMHG | HEART RATE: 75 BPM | OXYGEN SATURATION: 98 %

## 2021-11-23 ENCOUNTER — LAB (OUTPATIENT)
Dept: LAB | Facility: HOSPITAL | Age: 61
End: 2021-11-23

## 2021-11-23 ENCOUNTER — PREP FOR SURGERY (OUTPATIENT)
Dept: OTHER | Facility: HOSPITAL | Age: 61
End: 2021-11-23

## 2021-11-23 ENCOUNTER — OFFICE VISIT (OUTPATIENT)
Dept: GASTROENTEROLOGY | Facility: CLINIC | Age: 61
End: 2021-11-23

## 2021-11-23 VITALS
SYSTOLIC BLOOD PRESSURE: 112 MMHG | DIASTOLIC BLOOD PRESSURE: 80 MMHG | WEIGHT: 182.98 LBS | BODY MASS INDEX: 24.78 KG/M2 | HEIGHT: 72 IN

## 2021-11-23 DIAGNOSIS — R76.8 HEPATITIS C ANTIBODY TEST POSITIVE: ICD-10-CM

## 2021-11-23 DIAGNOSIS — K63.5 POLYP OF COLON, UNSPECIFIED PART OF COLON, UNSPECIFIED TYPE: ICD-10-CM

## 2021-11-23 DIAGNOSIS — R76.8 HEPATITIS C ANTIBODY TEST POSITIVE: Primary | ICD-10-CM

## 2021-11-23 DIAGNOSIS — Z12.11 ENCOUNTER FOR SCREENING COLONOSCOPY: Primary | ICD-10-CM

## 2021-11-23 LAB
ALBUMIN SERPL-MCNC: 5 G/DL (ref 3.5–5.2)
ALBUMIN/GLOB SERPL: 2 G/DL
ALP SERPL-CCNC: 99 U/L (ref 39–117)
ALT SERPL W P-5'-P-CCNC: 23 U/L (ref 1–41)
ANION GAP SERPL CALCULATED.3IONS-SCNC: 7 MMOL/L (ref 5–15)
AST SERPL-CCNC: 28 U/L (ref 1–40)
BASOPHILS # BLD AUTO: 0.03 10*3/MM3 (ref 0–0.2)
BASOPHILS NFR BLD AUTO: 0.7 % (ref 0–1.5)
BILIRUB SERPL-MCNC: 0.5 MG/DL (ref 0–1.2)
BUN SERPL-MCNC: 14 MG/DL (ref 6–20)
BUN/CREAT SERPL: 14.3 (ref 7–25)
CALCIUM SPEC-SCNC: 9.5 MG/DL (ref 8.6–10.5)
CHLORIDE SERPL-SCNC: 102 MMOL/L (ref 98–107)
CO2 SERPL-SCNC: 32 MMOL/L (ref 22–29)
CREAT SERPL-MCNC: 0.98 MG/DL (ref 0.76–1.27)
DEPRECATED RDW RBC AUTO: 38.7 FL (ref 37–54)
EOSINOPHIL # BLD AUTO: 0.23 10*3/MM3 (ref 0–0.4)
EOSINOPHIL NFR BLD AUTO: 5 % (ref 0.3–6.2)
ERYTHROCYTE [DISTWIDTH] IN BLOOD BY AUTOMATED COUNT: 11.9 % (ref 12.3–15.4)
GFR SERPL CREATININE-BSD FRML MDRD: 78 ML/MIN/1.73
GLOBULIN UR ELPH-MCNC: 2.5 GM/DL
GLUCOSE SERPL-MCNC: 80 MG/DL (ref 65–99)
HCT VFR BLD AUTO: 44.4 % (ref 37.5–51)
HGB BLD-MCNC: 15.9 G/DL (ref 13–17.7)
IMM GRANULOCYTES # BLD AUTO: 0.01 10*3/MM3 (ref 0–0.05)
IMM GRANULOCYTES NFR BLD AUTO: 0.2 % (ref 0–0.5)
LYMPHOCYTES # BLD AUTO: 2.11 10*3/MM3 (ref 0.7–3.1)
LYMPHOCYTES NFR BLD AUTO: 46 % (ref 19.6–45.3)
MCH RBC QN AUTO: 32.3 PG (ref 26.6–33)
MCHC RBC AUTO-ENTMCNC: 35.8 G/DL (ref 31.5–35.7)
MCV RBC AUTO: 90.2 FL (ref 79–97)
MONOCYTES # BLD AUTO: 0.47 10*3/MM3 (ref 0.1–0.9)
MONOCYTES NFR BLD AUTO: 10.2 % (ref 5–12)
NEUTROPHILS NFR BLD AUTO: 1.74 10*3/MM3 (ref 1.7–7)
NEUTROPHILS NFR BLD AUTO: 37.9 % (ref 42.7–76)
NRBC BLD AUTO-RTO: 0 /100 WBC (ref 0–0.2)
PLATELET # BLD AUTO: 160 10*3/MM3 (ref 140–450)
PMV BLD AUTO: 10.2 FL (ref 6–12)
POTASSIUM SERPL-SCNC: 4.1 MMOL/L (ref 3.5–5.2)
PROT SERPL-MCNC: 7.5 G/DL (ref 6–8.5)
RBC # BLD AUTO: 4.92 10*6/MM3 (ref 4.14–5.8)
SODIUM SERPL-SCNC: 141 MMOL/L (ref 136–145)
WBC NRBC COR # BLD: 4.59 10*3/MM3 (ref 3.4–10.8)

## 2021-11-23 PROCEDURE — 36415 COLL VENOUS BLD VENIPUNCTURE: CPT | Performed by: INTERNAL MEDICINE

## 2021-11-23 PROCEDURE — 99214 OFFICE O/P EST MOD 30 MIN: CPT | Performed by: INTERNAL MEDICINE

## 2021-11-23 PROCEDURE — 85025 COMPLETE CBC W/AUTO DIFF WBC: CPT

## 2021-11-23 PROCEDURE — 80053 COMPREHEN METABOLIC PANEL: CPT

## 2021-11-23 PROCEDURE — 87522 HEPATITIS C REVRS TRNSCRPJ: CPT | Performed by: INTERNAL MEDICINE

## 2021-11-23 RX ORDER — SODIUM, POTASSIUM,MAG SULFATES 17.5-3.13G
2 SOLUTION, RECONSTITUTED, ORAL ORAL EVERY 12 HOURS
Qty: 354 ML | Refills: 0 | Status: ON HOLD | OUTPATIENT
Start: 2021-11-23 | End: 2022-10-18

## 2021-11-23 RX ORDER — MULTIPLE VITAMINS W/ MINERALS TAB 9MG-400MCG
1 TAB ORAL DAILY
COMMUNITY

## 2021-11-23 RX ORDER — CETIRIZINE HYDROCHLORIDE 10 MG/1
10 TABLET ORAL DAILY
COMMUNITY
End: 2023-01-18

## 2021-11-23 RX ORDER — FLUTICASONE PROPIONATE 50 MCG
2 SPRAY, SUSPENSION (ML) NASAL DAILY
COMMUNITY

## 2021-11-23 NOTE — PROGRESS NOTES
Patient Name: Angel Velasco   Visit Date: 11/23/2021   Patient ID: [id number]  Provider: Niyah Durán MD    Sex: male  Location: [facility]   YOB: 1960  [facility] [place] [address]  [place]    Primary Care Provider Ana Ho MD  Location Phone: [phone]   Referring Provider: No ref. provider found        Chief Complaint  Follow-up and Cirrhosis    History of Present Illness  Angel Velasco is a 61 y.o. male who presents to [facility] for follow-up of HCV.    Mr. Velasco is a 61 year old male with history of chronic HCV s/p 24 weeks Harvoni with SVR who presents for f/u.  Doing well since last visit.  No issues with abdominal pain, nausea, vomiting.  Weight stable.       FibroScan (10/2/20): S0/F0.  Right upper quadrant (10/2/2020): Liver appears normal in size and echotexture, no liver lesions, cholelithiasis.    EGD (10/25/2017): Normal esophagus, small hiatal hernia, normal stomach, normal duodenum.    Colonoscopy (10/25/2017): Moderate pandiverticulosis, evidence of previous colocolonic anastomosis in the sigmoid colon.  1 cm ascending colon polyp removed (sessile serrated), 3 mm sigmoid colon polyp removed (hyperplastic), grade 1 internal hemorrhoids.  Repeat recommended in 5 years.    Past Medical History:   Diagnosis Date   • Allergic rhinitis due to allergen 10/24/2016    well controlled    • Chronic hepatitis C without hepatic coma (HCC) 10/24/2016   • Chronic liver disease    • Colitis    • Diverticulitis    • Elevated blood sugar    • Hepatitis    • Reye syndrome (HCC)    • Sinus trouble        Past Surgical History:   Procedure Laterality Date   • COLONOSCOPY  2009,2012   • CYSTOSCOPY      and ureteroscopy with stent placement   • LIVER BIOPSY     • OTHER SURGICAL HISTORY  11/2009    sigmoid colectomy    • OTHER SURGICAL HISTORY  1966    scalp surgery, excision of lesion  "         Current Outpatient Medications:   •  cetirizine (zyrTEC) 10 MG tablet, Take 10 mg by mouth Daily., Disp: , Rfl:   •  fluticasone (FLONASE) 50 MCG/ACT nasal spray, 2 sprays into the nostril(s) as directed by provider Daily., Disp: , Rfl:   •  MILK THISTLE PO, Take  by mouth., Disp: , Rfl:   •  multivitamin with minerals (MULTIVITAMIN ADULT PO), Take 1 tablet by mouth Daily., Disp: , Rfl:   •  vitamin D3 125 MCG (5000 UT) capsule capsule, Take 5,000 Units by mouth Daily., Disp: , Rfl:   •  sodium-potassium-magnesium sulfates (Suprep Bowel Prep Kit) 17.5-3.13-1.6 GM/177ML solution oral solution, Take 2 bottles by mouth Every 12 (Twelve) Hours., Disp: 354 mL, Rfl: 0     Allergies   Allergen Reactions   • Penicillins Itching   • Doxycycline Rash       Family History   Problem Relation Age of Onset   • Heart disease Father    • Stroke Other    • Diabetes Other         Social History     Social History Narrative   • Not on file       Objective     Review of Systems   Constitutional: Negative for chills, fever, unexpected weight gain and unexpected weight loss.   Respiratory: Negative for cough and shortness of breath.    Cardiovascular: Negative for chest pain.   Gastrointestinal:        Positive for *See HPI*   Skin: Skin lesions: No new lesions.   Neurological: Negative for numbness (or tingling).        Vital Signs:   /80   Ht 182.9 cm (72.01\")   Wt 83 kg (182 lb 15.7 oz)   BMI 24.81 kg/m²       Physical Exam  Constitutional:       General: He is not in acute distress.     Appearance: Normal appearance.   HENT:      Head: Normocephalic.   Eyes:      Conjunctiva/sclera: Conjunctivae normal.      Pupils: Pupils are equal, round, and reactive to light.      Visual Fields: Right eye visual fields normal and left eye visual fields normal.   Neck:      Trachea: Trachea normal.   Cardiovascular:      Rate and Rhythm: Normal rate and regular rhythm.      Heart sounds: Normal heart sounds.   Pulmonary:      " Effort: Pulmonary effort is normal.      Breath sounds: Normal breath sounds and air entry.   Abdominal:      General: Abdomen is flat. Bowel sounds are normal.      Palpations: Abdomen is soft. There is no mass.      Tenderness: There is no guarding.   Musculoskeletal:      Right lower leg: No edema.      Left lower leg: No edema.   Skin:     Findings: No lesion.   Neurological:      Mental Status: He is alert and oriented to person, place, and time.   Psychiatric:         Mood and Affect: Mood and affect normal.         Result Review :   The following data was reviewed by: Niyah Durán MD on 11/23/2021:             Assessment and Plan    Diagnoses and all orders for this visit:    1. Hepatitis C antibody test positive (Primary)  -     CBC & Differential; Future  -     Comprehensive Metabolic Panel; Future  -     Hepatitis C RNA, Quantitative, PCR (graph)    2. Polyp of colon, unspecified part of colon, unspecified type    Other orders  -     sodium-potassium-magnesium sulfates (Suprep Bowel Prep Kit) 17.5-3.13-1.6 GM/177ML solution oral solution; Take 2 bottles by mouth Every 12 (Twelve) Hours.  Dispense: 354 mL; Refill: 0        · Given most recent RUQ US showing normal liver and Fibroscan with F0 (no fibrosis), low suspicion for cirrhosis. Would not recommend serial RUQ US every 6 months since no signs of cirrhosis on most recent imaging/diagnostic tests. Previous dx was based on a Fibrosure. Fibroscan would correlate more closely with liver bx results. Pt reports last liver bx about 5 years ago that showed no fibrosis.   · H/o colon polyps: colonoscopy due next year.  Pt would like to schedule.      Follow Up   No follow-ups on file.  Patient was given instructions and counseling regarding his condition or for health maintenance advice. Please see specific information pulled into the AVS if appropriate.              Niyah Durán M.D.  [facility] [place] [address]  [place]  [place]  [address]  Office: [phone]

## 2021-11-26 LAB
HCV RNA SERPL NAA+PROBE-ACNC: NORMAL IU/ML
TEST INFORMATION: NORMAL

## 2021-11-29 ENCOUNTER — TELEPHONE (OUTPATIENT)
Dept: GASTROENTEROLOGY | Facility: CLINIC | Age: 61
End: 2021-11-29

## 2022-06-10 ENCOUNTER — TELEPHONE (OUTPATIENT)
Dept: GASTROENTEROLOGY | Facility: CLINIC | Age: 62
End: 2022-06-10

## 2022-06-10 NOTE — TELEPHONE ENCOUNTER
Patient was scheduled for procedure 10/03 due to Dr Durán being out of office contacted to let them know we would need to reschedule. Patient was aware and asked for us to notify him through text ([facility])   Contacted Lianne with scheduling and got patient rescheduled to 10/18/2022 arrive at 9:00 am

## 2022-09-28 ENCOUNTER — TELEPHONE (OUTPATIENT)
Dept: GASTROENTEROLOGY | Facility: CLINIC | Age: 62
End: 2022-09-28

## 2022-10-18 ENCOUNTER — ANESTHESIA (OUTPATIENT)
Dept: GASTROENTEROLOGY | Facility: HOSPITAL | Age: 62
End: 2022-10-18

## 2022-10-18 ENCOUNTER — HOSPITAL ENCOUNTER (OUTPATIENT)
Facility: HOSPITAL | Age: 62
Setting detail: HOSPITAL OUTPATIENT SURGERY
Discharge: HOME | End: 2022-10-18
Attending: INTERNAL MEDICINE | Admitting: INTERNAL MEDICINE

## 2022-10-18 ENCOUNTER — ANESTHESIA EVENT (OUTPATIENT)
Dept: GASTROENTEROLOGY | Facility: HOSPITAL | Age: 62
End: 2022-10-18

## 2022-10-18 VITALS
HEIGHT: 72 IN | HEART RATE: 63 BPM | SYSTOLIC BLOOD PRESSURE: 118 MMHG | TEMPERATURE: 97.1 F | DIASTOLIC BLOOD PRESSURE: 95 MMHG | OXYGEN SATURATION: 99 % | RESPIRATION RATE: 10 BRPM | BODY MASS INDEX: 24.38 KG/M2 | WEIGHT: 180 LBS

## 2022-10-18 DIAGNOSIS — Z12.11 ENCOUNTER FOR SCREENING COLONOSCOPY: ICD-10-CM

## 2022-10-18 PROCEDURE — 88305 TISSUE EXAM BY PATHOLOGIST: CPT | Performed by: INTERNAL MEDICINE

## 2022-10-18 PROCEDURE — 25010000002 PROPOFOL 10 MG/ML EMULSION: Performed by: NURSE ANESTHETIST, CERTIFIED REGISTERED

## 2022-10-18 PROCEDURE — 45385 COLONOSCOPY W/LESION REMOVAL: CPT | Performed by: INTERNAL MEDICINE

## 2022-10-18 RX ORDER — PROPOFOL 10 MG/ML
VIAL (ML) INTRAVENOUS AS NEEDED
Status: DISCONTINUED | OUTPATIENT
Start: 2022-10-18 | End: 2022-10-18 | Stop reason: SURG

## 2022-10-18 RX ORDER — LIDOCAINE HYDROCHLORIDE 20 MG/ML
INJECTION, SOLUTION EPIDURAL; INFILTRATION; INTRACAUDAL; PERINEURAL AS NEEDED
Status: DISCONTINUED | OUTPATIENT
Start: 2022-10-18 | End: 2022-10-18 | Stop reason: SURG

## 2022-10-18 RX ORDER — SODIUM CHLORIDE, SODIUM LACTATE, POTASSIUM CHLORIDE, CALCIUM CHLORIDE 600; 310; 30; 20 MG/100ML; MG/100ML; MG/100ML; MG/100ML
30 INJECTION, SOLUTION INTRAVENOUS CONTINUOUS
Status: DISCONTINUED | OUTPATIENT
Start: 2022-10-18 | End: 2022-10-18 | Stop reason: HOSPADM

## 2022-10-18 RX ADMIN — LIDOCAINE HYDROCHLORIDE 100 MG: 20 INJECTION, SOLUTION EPIDURAL; INFILTRATION; INTRACAUDAL; PERINEURAL at 10:25

## 2022-10-18 RX ADMIN — PROPOFOL 200 MCG/KG/MIN: 10 INJECTION, EMULSION INTRAVENOUS at 10:25

## 2022-10-18 RX ADMIN — SODIUM CHLORIDE, POTASSIUM CHLORIDE, SODIUM LACTATE AND CALCIUM CHLORIDE 30 ML/HR: 600; 310; 30; 20 INJECTION, SOLUTION INTRAVENOUS at 09:50

## 2022-10-18 RX ADMIN — PROPOFOL 50 MG: 10 INJECTION, EMULSION INTRAVENOUS at 10:25

## 2022-10-18 NOTE — ANESTHESIA PREPROCEDURE EVALUATION
Anesthesia Evaluation     Patient summary reviewed and Nursing notes reviewed   no history of anesthetic complications:  NPO Solid Status: > 8 hours  NPO Liquid Status: > 2 hours           Airway   Mallampati: II  TM distance: >3 FB  Neck ROM: full  No difficulty expected  Dental      Pulmonary - negative pulmonary ROS and normal exam    breath sounds clear to auscultation  Cardiovascular - negative cardio ROS and normal exam  Exercise tolerance: good (4-7 METS)    Rhythm: regular  Rate: normal        Neuro/Psych- negative ROS  GI/Hepatic/Renal/Endo    (+)   hepatitis, liver disease,     Musculoskeletal (-) negative ROS    Abdominal    Substance History - negative use     OB/GYN negative ob/gyn ROS         Other - negative ROS       ROS/Med Hx Other: PAT Nursing Notes unavailable.             Latest Reference Range & Units 11/23/21 15:15   Glucose 65 - 99 mg/dL 80   Sodium 136 - 145 mmol/L 141   Potassium 3.5 - 5.2 mmol/L 4.1   CO2 22.0 - 29.0 mmol/L 32.0 (H)   Chloride 98 - 107 mmol/L 102   Anion Gap 5.0 - 15.0 mmol/L 7.0   Creatinine 0.76 - 1.27 mg/dL 0.98   BUN 6 - 20 mg/dL 14   BUN/Creatinine Ratio 7.0 - 25.0  14.3   Calcium 8.6 - 10.5 mg/dL 9.5   eGFR Non African Am >60 mL/min/1.73 78   Alkaline Phosphatase 39 - 117 U/L 99   Total Protein 6.0 - 8.5 g/dL 7.5   ALT (SGPT) 1 - 41 U/L 23   AST (SGOT) 1 - 40 U/L 28   Total Bilirubin 0.0 - 1.2 mg/dL 0.5   Albumin 3.50 - 5.20 g/dL 5.00   Globulin gm/dL 2.5   A/G Ratio g/dL 2.0   (H): Data is abnormally high             Anesthesia Plan    ASA 2     general     (Total IV Anesthesia    Patient understands anesthesia not responsible for dental damage.  )  intravenous induction     Anesthetic plan, risks, benefits, and alternatives have been provided, discussed and informed consent has been obtained with: patient.    Plan discussed with CRNA.        CODE STATUS:

## 2022-10-18 NOTE — H&P
"Pre Procedure History & Physical    Chief Complaint:   Screening colonoscopy    Subjective     HPI:   63 yo M here for screening colonoscopy.    Past Medical History:   Past Medical History:   Diagnosis Date   • Allergic rhinitis due to allergen 10/24/2016    well controlled    • Chronic hepatitis C without hepatic coma (HCC) 10/24/2016   • Chronic liver disease    • Colitis    • Diverticulitis    • Elevated blood sugar    • Hepatitis    • Reye syndrome (HCC)    • Sinus trouble        Past Surgical History:  Past Surgical History:   Procedure Laterality Date   • COLONOSCOPY  2009,2012   • CYSTOSCOPY      and ureteroscopy with stent placement   • LIVER BIOPSY     • OTHER SURGICAL HISTORY  11/2009    sigmoid colectomy    • OTHER SURGICAL HISTORY  1966    scalp surgery, excision of lesion        Family History:  Family History   Problem Relation Age of Onset   • Heart disease Father    • Stroke Other    • Diabetes Other    • Malig Hyperthermia Neg Hx        Social History:   reports that he has never smoked. He does not have any smokeless tobacco history on file. He reports that he does not drink alcohol and does not use drugs.    Medications:   Medications Prior to Admission   Medication Sig Dispense Refill Last Dose   • cetirizine (zyrTEC) 10 MG tablet Take 10 mg by mouth Daily.      • fluticasone (FLONASE) 50 MCG/ACT nasal spray 2 sprays into the nostril(s) as directed by provider Daily.      • MILK THISTLE PO Take  by mouth.      • multivitamin with minerals tablet tablet Take 1 tablet by mouth Daily.      • vitamin D3 125 MCG (5000 UT) capsule capsule Take 5,000 Units by mouth Daily.      • sodium-potassium-magnesium sulfates (Suprep Bowel Prep Kit) 17.5-3.13-1.6 GM/177ML solution oral solution Take 2 bottles by mouth Every 12 (Twelve) Hours. 354 mL 0        Allergies:  Penicillins and Doxycycline    ROS:    Pertinent items are noted in HPI     Objective     Height 182.9 cm (72\"), weight 81.6 kg (180 " lb).    Physical Exam   Constitutional: Pt is oriented to person, place, and time and well-developed, well-nourished, and in no distress.   Mouth/Throat: Oropharynx is clear and moist.   Neck: Normal range of motion.   Cardiovascular: No chest tenderness    Pulmonary/Chest: Breathing unlabored  Abdominal: Soft. Nontender  Skin: Skin is warm and dry.   Psychiatric: Mood, memory, affect and judgment normal.     Assessment & Plan     Diagnosis:  Screening colonoscopy    Anticipated Surgical Procedure:  Colonoscopy    The risks, benefits, and alternatives of this procedure have been discussed with the patient or the responsible party- the patient understands and agrees to proceed.

## 2022-10-18 NOTE — ANESTHESIA POSTPROCEDURE EVALUATION
Patient: Angel Velasco    Procedure Summary     Date: 10/18/22 Room / Location: [facility] ENDOSCOPY 3 / [facility] END[facility]COPY    Anesthesia Start: 1023 Anesthesia Stop: 1052    Procedure: COLONOSCOPY WITH POLYPECTOMY Diagnosis:       Encounter for screening colonoscopy      (Encounter for screening colonoscopy [Z12.11])    Surgeons: Niyah Durán MD Provider: Gautam Haley MD    Anesthesia Type: general ASA Status: 2          Anesthesia Type: general    Vitals  Vitals Value Taken Time   /80 10/18/22 1059   Temp 36.1 °C (96.9 °F) 10/18/22 1049   Pulse 63 10/18/22 1059   Resp 12 10/18/22 1059   SpO2 99 % 10/18/22 1059           Post Anesthesia Care and Evaluation    Patient location during evaluation: bedside  Patient participation: complete - patient participated  Level of consciousness: awake  Pain management: adequate    Airway patency: patent  Anesthetic complications: No anesthetic complications  PONV Status: none  Cardiovascular status: acceptable and stable  Respiratory status: acceptable  Hydration status: acceptable    Comments: An Anesthesiologist personally participated in the most demanding procedures (including induction and emergence if applicable) in the anesthesia plan, monitored the course of anesthesia administration at frequent intervals and remained physically present and available for immediate diagnosis and treatment of emergencies.

## 2022-10-19 ENCOUNTER — TELEPHONE (OUTPATIENT)
Dept: GASTROENTEROLOGY | Facility: CLINIC | Age: 62
End: 2022-10-19

## 2022-10-19 LAB
CYTO UR: NORMAL
LAB AP CASE REPORT: NORMAL
LAB AP CLINICAL INFORMATION: NORMAL
PATH REPORT.FINAL DX SPEC: NORMAL
PATH REPORT.GROSS SPEC: NORMAL

## 2022-10-19 NOTE — TELEPHONE ENCOUNTER
Patient wife  Called said  had colon yesterday at 2 am this morning she said he woke up with vomiting and upper gastri pain. She called the  Hospital they told her to contact us. She just wanted Dr. AL to know she doesn't think it had anything to do with the colonoscopy, not sure.

## 2023-01-18 ENCOUNTER — APPOINTMENT (OUTPATIENT)
Dept: MRI IMAGING | Facility: HOSPITAL | Age: 63
DRG: 446 | End: 2023-01-18
Payer: COMMERCIAL

## 2023-01-18 ENCOUNTER — HOSPITAL ENCOUNTER (INPATIENT)
Facility: HOSPITAL | Age: 63
LOS: 2 days | Discharge: HOME | DRG: 446 | End: 2023-01-20
Attending: EMERGENCY MEDICINE | Admitting: INTERNAL MEDICINE
Payer: COMMERCIAL

## 2023-01-18 ENCOUNTER — APPOINTMENT (OUTPATIENT)
Dept: GENERAL RADIOLOGY | Facility: HOSPITAL | Age: 63
DRG: 446 | End: 2023-01-18
Payer: COMMERCIAL

## 2023-01-18 ENCOUNTER — APPOINTMENT (OUTPATIENT)
Dept: CT IMAGING | Facility: HOSPITAL | Age: 63
DRG: 446 | End: 2023-01-18
Payer: COMMERCIAL

## 2023-01-18 DIAGNOSIS — R10.13 ABDOMINAL PAIN, ACUTE, EPIGASTRIC: Primary | ICD-10-CM

## 2023-01-18 DIAGNOSIS — K80.20 CALCULUS OF GALLBLADDER WITHOUT CHOLECYSTITIS WITHOUT OBSTRUCTION: ICD-10-CM

## 2023-01-18 DIAGNOSIS — R79.89 ELEVATED LFTS: ICD-10-CM

## 2023-01-18 LAB
ALBUMIN SERPL-MCNC: 4.9 G/DL (ref 3.5–5.2)
ALBUMIN/GLOB SERPL: 2 G/DL
ALP SERPL-CCNC: 147 U/L (ref 39–117)
ALT SERPL W P-5'-P-CCNC: 482 U/L (ref 1–41)
ANION GAP SERPL CALCULATED.3IONS-SCNC: 9.7 MMOL/L (ref 5–15)
AST SERPL-CCNC: 362 U/L (ref 1–40)
BASOPHILS # BLD AUTO: 0.02 10*3/MM3 (ref 0–0.2)
BASOPHILS NFR BLD AUTO: 0.3 % (ref 0–1.5)
BILIRUB SERPL-MCNC: 5.6 MG/DL (ref 0–1.2)
BUN SERPL-MCNC: 12 MG/DL (ref 8–23)
BUN/CREAT SERPL: 11.5 (ref 7–25)
CALCIUM SPEC-SCNC: 9.7 MG/DL (ref 8.6–10.5)
CHLORIDE SERPL-SCNC: 100 MMOL/L (ref 98–107)
CO2 SERPL-SCNC: 29.3 MMOL/L (ref 22–29)
CREAT SERPL-MCNC: 1.04 MG/DL (ref 0.76–1.27)
DEPRECATED RDW RBC AUTO: 38.4 FL (ref 37–54)
EGFRCR SERPLBLD CKD-EPI 2021: 82.2 ML/MIN/1.73
EOSINOPHIL # BLD AUTO: 0.13 10*3/MM3 (ref 0–0.4)
EOSINOPHIL NFR BLD AUTO: 2.1 % (ref 0.3–6.2)
ERYTHROCYTE [DISTWIDTH] IN BLOOD BY AUTOMATED COUNT: 12 % (ref 12.3–15.4)
GLOBULIN UR ELPH-MCNC: 2.5 GM/DL
GLUCOSE SERPL-MCNC: 110 MG/DL (ref 65–99)
HCT VFR BLD AUTO: 44.5 % (ref 37.5–51)
HGB BLD-MCNC: 16 G/DL (ref 13–17.7)
HOLD SPECIMEN: NORMAL
HOLD SPECIMEN: NORMAL
IMM GRANULOCYTES # BLD AUTO: 0.02 10*3/MM3 (ref 0–0.05)
IMM GRANULOCYTES NFR BLD AUTO: 0.3 % (ref 0–0.5)
LIPASE SERPL-CCNC: 29 U/L (ref 13–60)
LYMPHOCYTES # BLD AUTO: 1.85 10*3/MM3 (ref 0.7–3.1)
LYMPHOCYTES NFR BLD AUTO: 30.1 % (ref 19.6–45.3)
MAGNESIUM SERPL-MCNC: 2.3 MG/DL (ref 1.6–2.4)
MCH RBC QN AUTO: 31.7 PG (ref 26.6–33)
MCHC RBC AUTO-ENTMCNC: 36 G/DL (ref 31.5–35.7)
MCV RBC AUTO: 88.1 FL (ref 79–97)
MONOCYTES # BLD AUTO: 0.62 10*3/MM3 (ref 0.1–0.9)
MONOCYTES NFR BLD AUTO: 10.1 % (ref 5–12)
NEUTROPHILS NFR BLD AUTO: 3.51 10*3/MM3 (ref 1.7–7)
NEUTROPHILS NFR BLD AUTO: 57.1 % (ref 42.7–76)
NRBC BLD AUTO-RTO: 0 /100 WBC (ref 0–0.2)
NT-PROBNP SERPL-MCNC: 72.9 PG/ML (ref 0–900)
PLATELET # BLD AUTO: 161 10*3/MM3 (ref 140–450)
PMV BLD AUTO: 9.4 FL (ref 6–12)
POTASSIUM SERPL-SCNC: 4.2 MMOL/L (ref 3.5–5.2)
PROT SERPL-MCNC: 7.4 G/DL (ref 6–8.5)
RBC # BLD AUTO: 5.05 10*6/MM3 (ref 4.14–5.8)
SODIUM SERPL-SCNC: 139 MMOL/L (ref 136–145)
TROPONIN I SERPL-MCNC: 0 NG/ML (ref 0–0.08)
TROPONIN I SERPL-MCNC: 0 NG/ML (ref 0–0.08)
WBC NRBC COR # BLD: 6.15 10*3/MM3 (ref 3.4–10.8)
WHOLE BLOOD HOLD COAG: NORMAL
WHOLE BLOOD HOLD SPECIMEN: NORMAL

## 2023-01-18 PROCEDURE — 25010000002 ONDANSETRON PER 1 MG: Performed by: EMERGENCY MEDICINE

## 2023-01-18 PROCEDURE — 99285 EMERGENCY DEPT VISIT HI MDM: CPT

## 2023-01-18 PROCEDURE — 71045 X-RAY EXAM CHEST 1 VIEW: CPT

## 2023-01-18 PROCEDURE — 0 IOPAMIDOL PER 1 ML: Performed by: EMERGENCY MEDICINE

## 2023-01-18 PROCEDURE — 74177 CT ABD & PELVIS W/CONTRAST: CPT

## 2023-01-18 PROCEDURE — 85025 COMPLETE CBC W/AUTO DIFF WBC: CPT

## 2023-01-18 PROCEDURE — 80053 COMPREHEN METABOLIC PANEL: CPT

## 2023-01-18 PROCEDURE — 93005 ELECTROCARDIOGRAM TRACING: CPT | Performed by: EMERGENCY MEDICINE

## 2023-01-18 PROCEDURE — 99223 1ST HOSP IP/OBS HIGH 75: CPT | Performed by: INTERNAL MEDICINE

## 2023-01-18 PROCEDURE — 83880 ASSAY OF NATRIURETIC PEPTIDE: CPT

## 2023-01-18 PROCEDURE — 93010 ELECTROCARDIOGRAM REPORT: CPT | Performed by: INTERNAL MEDICINE

## 2023-01-18 PROCEDURE — 83690 ASSAY OF LIPASE: CPT

## 2023-01-18 PROCEDURE — 83735 ASSAY OF MAGNESIUM: CPT

## 2023-01-18 PROCEDURE — 36415 COLL VENOUS BLD VENIPUNCTURE: CPT

## 2023-01-18 PROCEDURE — 96375 TX/PRO/DX INJ NEW DRUG ADDON: CPT

## 2023-01-18 PROCEDURE — 25010000002 LEVOFLOXACIN PER 250 MG: Performed by: EMERGENCY MEDICINE

## 2023-01-18 PROCEDURE — 25010000002 METRONIDAZOLE 500 MG/100ML SOLUTION: Performed by: EMERGENCY MEDICINE

## 2023-01-18 PROCEDURE — 84484 ASSAY OF TROPONIN QUANT: CPT

## 2023-01-18 PROCEDURE — 74181 MRI ABDOMEN W/O CONTRAST: CPT

## 2023-01-18 PROCEDURE — 93005 ELECTROCARDIOGRAM TRACING: CPT

## 2023-01-18 PROCEDURE — 96365 THER/PROPH/DIAG IV INF INIT: CPT

## 2023-01-18 PROCEDURE — 96366 THER/PROPH/DIAG IV INF ADDON: CPT

## 2023-01-18 RX ORDER — METRONIDAZOLE 500 MG/100ML
500 INJECTION, SOLUTION INTRAVENOUS ONCE
Status: COMPLETED | OUTPATIENT
Start: 2023-01-18 | End: 2023-01-18

## 2023-01-18 RX ORDER — ONDANSETRON 2 MG/ML
4 INJECTION INTRAMUSCULAR; INTRAVENOUS ONCE
Status: COMPLETED | OUTPATIENT
Start: 2023-01-18 | End: 2023-01-18

## 2023-01-18 RX ORDER — SODIUM CHLORIDE 0.9 % (FLUSH) 0.9 %
10 SYRINGE (ML) INJECTION AS NEEDED
Status: DISCONTINUED | OUTPATIENT
Start: 2023-01-18 | End: 2023-01-20 | Stop reason: HOSPADM

## 2023-01-18 RX ORDER — LEVOFLOXACIN 5 MG/ML
500 INJECTION, SOLUTION INTRAVENOUS ONCE
Status: COMPLETED | OUTPATIENT
Start: 2023-01-18 | End: 2023-01-18

## 2023-01-18 RX ORDER — LEVOCETIRIZINE DIHYDROCHLORIDE 5 MG/1
5 TABLET, FILM COATED ORAL DAILY
COMMUNITY

## 2023-01-18 RX ORDER — ASPIRIN 81 MG/1
324 TABLET, CHEWABLE ORAL ONCE
Status: DISCONTINUED | OUTPATIENT
Start: 2023-01-18 | End: 2023-01-20 | Stop reason: HOSPADM

## 2023-01-18 RX ADMIN — METRONIDAZOLE 500 MG: 500 INJECTION, SOLUTION INTRAVENOUS at 23:06

## 2023-01-18 RX ADMIN — LEVOFLOXACIN 500 MG: 5 INJECTION, SOLUTION INTRAVENOUS at 20:53

## 2023-01-18 RX ADMIN — ONDANSETRON 4 MG: 2 INJECTION INTRAMUSCULAR; INTRAVENOUS at 20:52

## 2023-01-18 RX ADMIN — SODIUM CHLORIDE 1000 ML: 9 INJECTION, SOLUTION INTRAVENOUS at 20:52

## 2023-01-18 RX ADMIN — IOPAMIDOL 100 ML: 755 INJECTION, SOLUTION INTRAVENOUS at 21:08

## 2023-01-18 NOTE — ED PROVIDER NOTES
Time: 6:29 PM EST  Date of encounter:  1/18/2023  Independent Historian/Clinical History and Information was obtained by:   Patient  Chief Complaint   Patient presents with   • Chest Pain       History is limited by: N/A    History of Present Illness:  Patient is a 62 y.o. year old male who presents to the emergency department for evaluation of epigastric chest pain that started on Sunday during a meal and resolved after vomiting. Pt began to experience epigastric chest pain again today and  states that he notices when he eats big meals.  Patient does not take any medication for acid reflux. Pt still has his gallbladder and reports previous history of having gallstones identified but no surgery performed. Pt reports nausea and vomiting. Pt says his abdominal pain feels better now.     Patient's family member notes that he is appearing yellowish in color.    HPI    Patient Care Team  Primary Care Provider: Ana Ho MD    Past Medical History:     Allergies   Allergen Reactions   • Penicillins Itching   • Doxycycline Rash     Past Medical History:   Diagnosis Date   • Allergic rhinitis due to allergen 10/24/2016    well controlled    • Chronic hepatitis C without hepatic coma (HCC) 10/24/2016   • Chronic liver disease    • Colitis    • Diverticulitis    • Hepatitis    • Reye syndrome (HCC)    • Sinus trouble      Past Surgical History:   Procedure Laterality Date   • COLONOSCOPY  2009,2012   • COLONOSCOPY N/A 10/18/2022    Procedure: COLONOSCOPY WITH POLYPECTOMY;  Surgeon: Niyah Durán MD;  Location: [facility] ENDOSCOPY;  Service: Gastroenterology;  Laterality: N/A;  COLON POLYPS, HEMORRHOIDS, DIVERTICULOSIS   • CYSTOSCOPY      and ureteroscopy with stent placement   • LIVER BIOPSY     • OTHER SURGICAL HISTORY  11/2009    sigmoid colectomy    • OTHER SURGICAL HISTORY  1966    scalp surgery, excision of lesion      Family History   Problem Relation Age of Onset   • Heart disease Father    • Stroke  "Other    • Diabetes Other    • Malig Hyperthermia Neg Hx        Home Medications:  Prior to Admission medications    Medication Sig Start Date End Date Taking? Authorizing Provider   cetirizine (zyrTEC) 10 MG tablet Take 10 mg by mouth Daily.    Jos Scott MD   fluticasone (FLONASE) 50 MCG/ACT nasal spray 2 sprays into the nostril(s) as directed by provider Daily.    Jos Scott MD   MILK THISTLE PO Take  by mouth.    Jos Scott MD   multivitamin with minerals tablet tablet Take 1 tablet by mouth Daily.    Jos Scott MD   vitamin D3 125 MCG (5000 UT) capsule capsule Take 5,000 Units by mouth Daily.    Jos Scott MD        Social History:   Social History     Tobacco Use   • Smoking status: Never   Vaping Use   • Vaping Use: Never used   Substance Use Topics   • Alcohol use: Never   • Drug use: Never         Review of Systems:  Review of Systems   Constitutional: Negative for chills and fever.   HENT: Negative for congestion, ear pain and sore throat.    Eyes: Negative for pain.   Respiratory: Negative for cough, chest tightness and shortness of breath.    Cardiovascular: Positive for chest pain.   Gastrointestinal: Positive for abdominal pain (epigastric), nausea and vomiting. Negative for diarrhea.   Genitourinary: Negative for flank pain and hematuria.   Musculoskeletal: Negative for joint swelling.   Skin: Negative for pallor.   Neurological: Negative for seizures and headaches.   All other systems reviewed and are negative.       Physical Exam:  /92 (BP Location: Right arm, Patient Position: Sitting)   Pulse 69   Temp 98.1 °F (36.7 °C) (Oral)   Resp 18   Ht 182.9 cm (72\")   Wt 81.5 kg (179 lb 10.8 oz)   SpO2 100%   BMI 24.37 kg/m²     Physical Exam  Vitals and nursing note reviewed.   Constitutional:       General: He is not in acute distress.     Appearance: Normal appearance. He is not toxic-appearing.   HENT:      Head: Normocephalic and " atraumatic.      Mouth/Throat:      Mouth: Mucous membranes are moist.   Eyes:      General: No scleral icterus.     Extraocular Movements: Extraocular movements intact.      Conjunctiva/sclera: Conjunctivae normal.   Cardiovascular:      Rate and Rhythm: Normal rate and regular rhythm.      Pulses: Normal pulses.           Radial pulses are 2+ on the right side and 2+ on the left side.      Heart sounds: Normal heart sounds. No murmur heard.    No friction rub.   Pulmonary:      Effort: Pulmonary effort is normal. No respiratory distress.      Breath sounds: Normal breath sounds. No decreased breath sounds, wheezing, rhonchi or rales.   Abdominal:      General: Abdomen is flat. Bowel sounds are normal. There is no distension.      Palpations: Abdomen is soft.      Tenderness: There is generalized abdominal tenderness (mild tenderness diffuse abdomen, more so in the epigastric area). There is no guarding or rebound.   Musculoskeletal:         General: Normal range of motion.      Cervical back: Normal range of motion and neck supple.      Right lower leg: No edema.      Left lower leg: No edema.   Skin:     General: Skin is warm and dry.      Coloration: Skin is jaundiced (mild). Skin is not cyanotic.   Neurological:      Mental Status: He is alert and oriented to person, place, and time. Mental status is at baseline.   Psychiatric:         Attention and Perception: Attention and perception normal.         Mood and Affect: Mood normal.                  Procedures:  Procedures      Medical Decision Making:      Comorbidities that affect care:    Chronic liver disease and HCV    External Notes reviewed:    Blood work, EKG, x-rays, ct scan, MRI, previous labs, hospital admission note.      The following orders were placed and all results were independently analyzed by me:  Orders Placed This Encounter   Procedures   • XR Chest 1 View   • CT Abdomen Pelvis With Contrast   • MRI abdomen wo contrast mrcp   • [place] Draw    • Comprehensive Metabolic Panel   • Lipase   • BNP   • Magnesium   • CBC Auto Differential   • Potassium   • Magnesium   • Troponin   • Blood Gas, Arterial -With Co-Ox Panel: Yes   • CBC Auto Differential   • Comprehensive Metabolic Panel   • NPO Diet NPO Type: Strict NPO   • Undress & Gown   • Continuous Pulse Oximetry   • Vital Signs   • Intake & Output   • Weigh Patient   • Oral Care   • Place Sequential Compression Device   • Maintain Sequential Compression Device   • Telemetry - Maintain IV Access   • Continuous Cardiac Monitoring   • Telemetry - Pulse Oximetry   • Up With Assistance   • Notify Provider (With Default Parameters)   • Code Status and Medical Interventions:   • Gastroenterology (on-call MD unless specified)   • Hospitalist (on-call MD unless specified)   • Inpatient Gastroenterology Consult   • Inpatient General Surgery Consult   • Surgery (on-call MD unless specified)   • Oxygen Therapy- Nasal Cannula; Titrate for SPO2: 90% - 95%   • Oxygen Therapy- Nasal Cannula; Titrate for SPO2: 90% - 95%   • POC Troponin I   • POC Troponin I   • POC Troponin I   • POC Troponin I   • ECG 12 Lead ED Triage Standing Order; Chest Pain   • ECG 12 Lead ED Triage Standing Order; Chest Pain   • ECG 12 Lead Chest Pain   • Insert Peripheral IV   • Insert Peripheral IV   • Insert Peripheral IV   • Inpatient Admission   • POC Troponin I with Hold Tube   • CBC & Differential   • Green Top (Gel)   • Lavender Top   • Gold Top - SST   • Light Blue Top   • HOLD Troponin-I Tube   • HOLD Troponin-I Tube       Medications Given in the Emergency Department:  Medications   sodium chloride 0.9 % flush 10 mL (has no administration in time range)   aspirin chewable tablet 324 mg (324 mg Oral Not Given 1/18/23 1901)   sodium chloride 0.9 % flush 10 mL (has no administration in time range)   sodium chloride 0.9 % flush 10 mL (has no administration in time range)   sodium chloride 0.9 % flush 10 mL (10 mL Intravenous Given 1/19/23  0043)   sodium chloride 0.9 % infusion 40 mL (has no administration in time range)   nitroglycerin (NITROSTAT) SL tablet 0.4 mg (has no administration in time range)   lactated ringers infusion (100 mL/hr Intravenous New Bag 1/19/23 0046)   acetaminophen (TYLENOL) tablet 500 mg (has no administration in time range)   morphine injection 2 mg (has no administration in time range)     And   naloxone (NARCAN) injection 0.4 mg (has no administration in time range)   ondansetron (ZOFRAN) injection 4 mg (has no administration in time range)   levoFLOXacin (LEVAQUIN) 750 mg/150 mL D5W (premix) (LEVAQUIN) 750 mg (has no administration in time range)   metroNIDAZOLE (FLAGYL) IVPB 500 mg (has no administration in time range)   sodium chloride 0.9 % bolus 1,000 mL (0 mL Intravenous Stopped 1/18/23 2303)   ondansetron (ZOFRAN) injection 4 mg (4 mg Intravenous Given 1/18/23 2052)   levoFLOXacin (LEVAQUIN) 500 mg/100 mL D5W (premix) (LEVAQUIN) 500 mg (0 mg Intravenous Stopped 1/18/23 2303)   iopamidol (ISOVUE-370) 76 % injection 100 mL (100 mL Intravenous Given 1/18/23 2108)   metroNIDAZOLE (FLAGYL) IVPB 500 mg (500 mg Intravenous New Bag 1/18/23 2306)        ED Course:    The patient was initially evaluated in the triage area where orders were placed. The patient was later dispositioned by Ivan Xie MD.      The patient was advised to stay for completion of workup which includes but is not limited to communication of labs and radiological results, reassessment and plan. The patient was advised that leaving prior to disposition by a provider could result in critical findings that are not communicated to the patient.     ED Course as of 01/19/23 0228 Wed Jan 18, 2023   1832 --- PROVIDER IN TRIAGE NOTE ---    The patient was evaluated my me, Mackenzie Ferrari in triage. Orders were placed and the patient is currently awaiting disposition.    [AJ]   1946 EKG: I reviewed and interpreted his twelve-lead EKG is normal sinus rhythm  at 68 bpm, normal P waves, normal QRS, normal ST segments and T waves; no acute ischemia or ectopy. [VS]      ED Course User Index  [AJ] Mackenzie Ferrari PA-C  [VS] Ivan Xie MD       Labs:    Lab Results (last 24 hours)     Procedure Component Value Units Date/Time    CBC & Differential [[id number]]  (Abnormal) Collected: 01/18/23 1834    Specimen: Blood Updated: 01/18/23 1847    Narrative:      The following orders were created for panel order CBC & Differential.  Procedure                               Abnormality         Status                     ---------                               -----------         ------                     CBC Auto Differential[[id number]]        Abnormal            Final result                 Please view results for these tests on the individual orders.    Comprehensive Metabolic Panel [[id number]]  (Abnormal) Collected: 01/18/23 1834    Specimen: Blood Updated: 01/18/23 1909     Glucose 110 mg/dL      BUN 12 mg/dL      Creatinine 1.04 mg/dL      Sodium 139 mmol/L      Potassium 4.2 mmol/L      Chloride 100 mmol/L      CO2 29.3 mmol/L      Calcium 9.7 mg/dL      Total Protein 7.4 g/dL      Albumin 4.9 g/dL      ALT (SGPT) 482 U/L      AST (SGOT) 362 U/L      Alkaline Phosphatase 147 U/L      Total Bilirubin 5.6 mg/dL      Globulin 2.5 gm/dL      A/G Ratio 2.0 g/dL      BUN/Creatinine Ratio 11.5     Anion Gap 9.7 mmol/L      eGFR 82.2 mL/min/1.73     Narrative:      GFR Normal >60  Chronic Kidney Disease <60  Kidney Failure <15      Lipase [[id number]]  (Normal) Collected: 01/18/23 1834    Specimen: Blood Updated: 01/18/23 1907     Lipase 29 U/L     BNP [[id number]]  (Normal) Collected: 01/18/23 1834    Specimen: Blood Updated: 01/18/23 1904     proBNP 72.9 pg/mL     Narrative:      Among patients with dyspnea, NT-proBNP is highly sensitive for the detection of acute congestive heart failure. In addition NT-proBNP of <300 pg/ml effectively rules out acute congestive heart failure  with 99% negative predictive value.      Magnesium [[id number]]  (Normal) Collected: 01/18/23 1834    Specimen: Blood Updated: 01/18/23 1907     Magnesium 2.3 mg/dL     CBC Auto Differential [[id number]]  (Abnormal) Collected: 01/18/23 1834    Specimen: Blood Updated: 01/18/23 1847     WBC 6.15 10*3/mm3      RBC 5.05 10*6/mm3      Hemoglobin 16.0 g/dL      Hematocrit 44.5 %      MCV 88.1 fL      MCH 31.7 pg      MCHC 36.0 g/dL      RDW 12.0 %      RDW-SD 38.4 fl      MPV 9.4 fL      Platelets 161 10*3/mm3      Neutrophil % 57.1 %      Lymphocyte % 30.1 %      Monocyte % 10.1 %      Eosinophil % 2.1 %      Basophil % 0.3 %      Immature Grans % 0.3 %      Neutrophils, Absolute 3.51 10*3/mm3      Lymphocytes, Absolute 1.85 10*3/mm3      Monocytes, Absolute 0.62 10*3/mm3      Eosinophils, Absolute 0.13 10*3/mm3      Basophils, Absolute 0.02 10*3/mm3      Immature Grans, Absolute 0.02 10*3/mm3      nRBC 0.0 /100 WBC     POC Troponin I [[id number]]  (Normal) Collected: 01/18/23 1837    Specimen: Blood Updated: 01/18/23 1849     Troponin I 0.00 ng/mL      Comment: Serial Number: [id number]:  [id number]       POC Troponin I [[id number]]  (Normal) Collected: 01/18/23 2100    Specimen: Blood Updated: 01/18/23 2113     Troponin I 0.00 ng/mL      Comment: Serial Number: [id number]:  [id number]              Imaging:    CT Abdomen Pelvis With Contrast    Result Date: 1/18/2023  PROCEDURE: CT ABDOMEN PELVIS W CONTRAST  COMPARISON: 7/2/2020.  INDICATIONS: Evaluate for possible biliary obstruction; h/o elevated LFTs & vomiting; upper abdominal pain; gallstones have been reported on prior ultrasound examinations of the abdomen.  TECHNIQUE: After obtaining the patient's consent, 501 CT images were created with non-ionic intravenous contrast material.   PROTOCOL:   Standard imaging protocol performed    RADIATION:   DLP: 309 mGy*cm   Automated exposure control was utilized to minimize radiation dose. CONTRAST: 100 cc Isovue 370 I.V. LABS:    eGFR: >60ml/min/1.73m2  FINDINGS: There is mild diffuse hepatic steatosis.  There is possible cirrhosis.  No hepatomegaly.  No splenomegaly.  The main portal vein is patent.  Mild age-indeterminate inflammatory changes are seen about the gallbladder.  No definite gallstones are seen by CT.  No biliary ductal dilatation is appreciated.  No pneumobilia.  No acute pancreatitis.  Chronic calcified granulomatous disease involves the abdomen.  No adrenal mass.  No ascites.  No acute intraperitoneal or retroperitoneal hemorrhage.  There has been partial resection of the distal colon.  The patient has undergone appendectomy, as well.  No acute colitis or diverticulitis or enteritis.  No mechanical bowel obstruction or pneumoperitoneum.  No pneumatosis.  Degenerative changes involve the imaged spine and the bilateral sacroiliac joints.  No acute fracture or aggressive osseous lesion.  There is bilateral L5 spondylolysis with grade 1 spondylolisthesis of L5 upon S1, estimated 7 mm or slightly less.  There is asymmetry of the posterior elements at the L5-S1 level.  The lung bases are clear of acute infiltrate.  Small bilateral fat containing inguinal hernias are seen, which measure about 3.2 cm in greatest diameter.  They do not contain bowel.  A tiny fat-containing umbilical hernia is seen.  It does not contain bowel.        Possible acute cholecystitis.  No definite gallstones are appreciated by CT.  No biliary dilatation.  No choledocholithiasis is seen.  Please see above comments for further detail.     Please note that portions of this note were completed with a voice recognition program.  AURORA MONSON JR, MD       Electronically Signed and Approved By: AURORA MONSON JR, MD on 1/18/2023 at 21:30              XR Chest 1 View    Result Date: 1/18/2023  PROCEDURE: XR CHEST 1 VW  COMPARISON: None  INDICATIONS: Chest Pain Triage Protocol  FINDINGS:  Lungs normally expanded.  No pneumothorax or pleural effusion.  No focal  pulmonary parenchymal opacity.  Pulmonary vessels are distinct.  Bones and soft tissues are normal.       No acute cardiopulmonary abnormality.       ARMOND SHEPPARD MD       Electronically Signed and Approved By: ARMOND SHEPPARD MD on 1/18/2023 at 19:05             MRI abdomen wo contrast mrcp    Result Date: 1/18/2023  PROCEDURE: MRI ABDOMEN WO CONTRAST MRCP  COMPARISON: 1/18/2023.  INDICATIONS:  Possible biliary ductal obstruction; epigastric abdominal pain for 3 days, especially after eating.  TECHNIQUE:  A comprehensive examination was performed utilizing a variety of imaging planes and imaging parameters to optimize visualization of suspected pathology.  Magnetic resonance cholangiopancreatography (MRCP) was also performed.  Six hundred ten (610) magnetic resonance (MR) images were obtained.  FINDINGS:  Gallstones are seen.  A 2 cm gallstone may be impacted in the gallbladder neck.  There is possible acute cholecystitis.  No biliary ductal dilatation or choledocholithiasis.  There is diffuse hepatic steatosis with borderline hepatomegaly.  No splenomegaly.  No acute pancreatitis is suggested.  No definite pancreatic mass is seen.  Nonenhanced MRI examination may be limited in detection of an underlying pancreatic mass.  No pancreatic ductal dilatation is seen.  There is slight motion artifact on the exam.  There is a suspected benign 2 cm lower pole parapelvic left renal cyst.        Gallstones are seen.  There is possible acute cholecystitis.  No biliary ductal dilatation or choledocholithiasis is appreciated.  No acute pancreatitis.  There is diffuse hepatic steatosis.  There is slight motion artifact on some of the images.     Please note that portions of this note were completed with a voice recognition program.  AURORA MONSON JR, MD       Electronically Signed and Approved By: AURORA MONSON JR, MD on 1/18/2023 at 22:30                  Differential Diagnosis and Discussion:      Abdominal Pain: Based on the  patient's signs and symptoms, I considered abdominal aortic aneurysm, small bowel obstruction, pancreatitis, acute cholecystitis, acute appendecitis, peptic ulcer disease, gastritis, colitis, endocrine disorders, irritable bowel syndrome and other differential diagnosis an etiology of the patient's abdominal pain.  Vomiting: Differential diagnosis includes but is not limited to migraine, labyrinthine disorders, psychogenic, metabolic and endocrine causes, peptic ulcer, gastric outlet obstruction, gastritis, gastroenteritis, appendicitis, intestinal obstruction, paralytic ileus, food poisoning, cholecystitis, acute hepatitis, acute pancreatitis, acute febrile illness, and myocardial infarction.    All labs were reviewed and analyzed by me.  All X-rays were independently reviewed by me.  EKG was interpreted by me.  CT scan radiology interpretation was reviewed by me.  MRI interpretation was reviewed by me.     MDM  Number of Diagnoses or Management Options  Abdominal pain, acute, epigastric  Elevated LFTs  Diagnosis management comments: 20:09 EST - Consult with gastroenterologist - Discussed patient's case, history, and current condition.         Amount and/or Complexity of Data Reviewed  Clinical lab tests: reviewed  Tests in the radiology section of CPT®: reviewed  Tests in the medicine section of CPT®: reviewed  Decide to obtain previous medical records or to obtain history from someone other than the patient: yes                 This patient is a pleasant 62-year-old male with previous history of liver disease and HCV now presenting with acute onset of nausea and vomiting as well as epigastric abdominal pain for the past few days and now starting to become jaundiced.    I reviewed his lab work today and I do not see any signs of acute infection and he has a normal white blood cell count and normal lipase.    However he does have significant elevation of all of his LFTs, possibly in an obstructive pattern.    Given  his upper abdominal pain and vomiting and now with jaundice and elevated LFTs I considered biliary obstruction likely.    CT of the abdomen and pelvis showed no obvious biliary ductal dilatation or obvious signs of obstruction but does note some gallbladder wall thickening.    I also obtain MRCP which came back negative for any choledocholithiasis or obvious obstruction but again shows presence of mild gallbladder wall thickening and also presence of gallstone.    I went back to reexamine the patient at the bedside and he has absolutely no tenderness in the right upper quadrant at this time and negative Morillo sign, and I conclude he most likely does not have acute cholecystitis.    He could be having episodes of biliary colic or possibly some intermittent choledocholithiasis not currently obstructing.    I have consulted with the patient's gastroenterologist on-call who will consult along and we will give him an empiric dose of antibiotics to cover for cholangitis at this point and admit him overnight to the medicine service for further management.                  Patient Care Considerations:          Consultants/Shared Management Plan:    Hospitalist: I have discussed the case with The admitting hospitalist who agrees to accept the patient for admission.  Consultant: I have discussed the case with Dr. Durán of gastroenterology, who agrees to consult on the patient.    Social Determinants of Health:    Patient is independent, reliable, and has access to care.       Disposition and Care Coordination:    Admit:   Through independent evaluation of the patient's history, physical, and imperical data, the patient meets criteria for observation/admission to the hospital.        Final diagnoses:   Abdominal pain, acute, epigastric   Elevated LFTs   Calculus of gallbladder without cholecystitis without obstruction        ED Disposition     ED Disposition   Decision to Admit    Condition   --    Comment   Level of Care:  Remote Telemetry [26]   Diagnosis: Abdominal pain, acute, epigastric [155211]   Certification: I Certify That Inpatient Hospital Services Are Medically Necessary For Greater Than 2 Midnights               This medical record created using voice recognition software.    Documentation assistance provided by Tad Dinero acting as scribe for Ivan Xie MD. Information recorded by the scribe was done at my direction and has been verified and validated by me.          Tad Dinero  01/18/23 2014       Ivan Xie MD  01/19/23 0228

## 2023-01-19 PROBLEM — R17 JAUNDICE: Status: ACTIVE | Noted: 2023-01-19

## 2023-01-19 LAB
ALBUMIN SERPL-MCNC: 4.3 G/DL (ref 3.5–5.2)
ALBUMIN/GLOB SERPL: 1.8 G/DL
ALP SERPL-CCNC: 154 U/L (ref 39–117)
ALT SERPL W P-5'-P-CCNC: 393 U/L (ref 1–41)
ANION GAP SERPL CALCULATED.3IONS-SCNC: 11.3 MMOL/L (ref 5–15)
AST SERPL-CCNC: 222 U/L (ref 1–40)
BASOPHILS # BLD AUTO: 0.02 10*3/MM3 (ref 0–0.2)
BASOPHILS NFR BLD AUTO: 0.4 % (ref 0–1.5)
BILIRUB SERPL-MCNC: 6.4 MG/DL (ref 0–1.2)
BUN SERPL-MCNC: 9 MG/DL (ref 8–23)
BUN/CREAT SERPL: 9.3 (ref 7–25)
CALCIUM SPEC-SCNC: 9.3 MG/DL (ref 8.6–10.5)
CHLORIDE SERPL-SCNC: 104 MMOL/L (ref 98–107)
CO2 SERPL-SCNC: 23.7 MMOL/L (ref 22–29)
CREAT SERPL-MCNC: 0.97 MG/DL (ref 0.76–1.27)
DEPRECATED RDW RBC AUTO: 38.5 FL (ref 37–54)
EGFRCR SERPLBLD CKD-EPI 2021: 89.4 ML/MIN/1.73
EOSINOPHIL # BLD AUTO: 0.16 10*3/MM3 (ref 0–0.4)
EOSINOPHIL NFR BLD AUTO: 3 % (ref 0.3–6.2)
ERYTHROCYTE [DISTWIDTH] IN BLOOD BY AUTOMATED COUNT: 11.9 % (ref 12.3–15.4)
GLOBULIN UR ELPH-MCNC: 2.4 GM/DL
GLUCOSE SERPL-MCNC: 103 MG/DL (ref 65–99)
HCT VFR BLD AUTO: 42.6 % (ref 37.5–51)
HGB BLD-MCNC: 15.5 G/DL (ref 13–17.7)
IMM GRANULOCYTES # BLD AUTO: 0.01 10*3/MM3 (ref 0–0.05)
IMM GRANULOCYTES NFR BLD AUTO: 0.2 % (ref 0–0.5)
LYMPHOCYTES # BLD AUTO: 1.48 10*3/MM3 (ref 0.7–3.1)
LYMPHOCYTES NFR BLD AUTO: 27.8 % (ref 19.6–45.3)
MCH RBC QN AUTO: 32.3 PG (ref 26.6–33)
MCHC RBC AUTO-ENTMCNC: 36.4 G/DL (ref 31.5–35.7)
MCV RBC AUTO: 88.8 FL (ref 79–97)
MONOCYTES # BLD AUTO: 0.56 10*3/MM3 (ref 0.1–0.9)
MONOCYTES NFR BLD AUTO: 10.5 % (ref 5–12)
NEUTROPHILS NFR BLD AUTO: 3.09 10*3/MM3 (ref 1.7–7)
NEUTROPHILS NFR BLD AUTO: 58.1 % (ref 42.7–76)
NRBC BLD AUTO-RTO: 0 /100 WBC (ref 0–0.2)
PLATELET # BLD AUTO: 147 10*3/MM3 (ref 140–450)
PMV BLD AUTO: 9.7 FL (ref 6–12)
POTASSIUM SERPL-SCNC: 4.1 MMOL/L (ref 3.5–5.2)
PROT SERPL-MCNC: 6.7 G/DL (ref 6–8.5)
QT INTERVAL: 381 MS
RBC # BLD AUTO: 4.8 10*6/MM3 (ref 4.14–5.8)
SODIUM SERPL-SCNC: 139 MMOL/L (ref 136–145)
WBC NRBC COR # BLD: 5.32 10*3/MM3 (ref 3.4–10.8)

## 2023-01-19 PROCEDURE — 94799 UNLISTED PULMONARY SVC/PX: CPT

## 2023-01-19 PROCEDURE — 85025 COMPLETE CBC W/AUTO DIFF WBC: CPT | Performed by: INTERNAL MEDICINE

## 2023-01-19 PROCEDURE — 99222 1ST HOSP IP/OBS MODERATE 55: CPT | Performed by: SURGERY

## 2023-01-19 PROCEDURE — 99232 SBSQ HOSP IP/OBS MODERATE 35: CPT | Performed by: FAMILY MEDICINE

## 2023-01-19 PROCEDURE — 80053 COMPREHEN METABOLIC PANEL: CPT | Performed by: INTERNAL MEDICINE

## 2023-01-19 PROCEDURE — 25010000002 METRONIDAZOLE 500 MG/100ML SOLUTION: Performed by: INTERNAL MEDICINE

## 2023-01-19 PROCEDURE — 25810000003 LACTATED RINGERS PER 1000 ML: Performed by: INTERNAL MEDICINE

## 2023-01-19 PROCEDURE — 25010000002 LEVOFLOXACIN PER 250 MG: Performed by: INTERNAL MEDICINE

## 2023-01-19 PROCEDURE — 99222 1ST HOSP IP/OBS MODERATE 55: CPT | Performed by: INTERNAL MEDICINE

## 2023-01-19 RX ORDER — SODIUM CHLORIDE 9 MG/ML
40 INJECTION, SOLUTION INTRAVENOUS AS NEEDED
Status: DISCONTINUED | OUTPATIENT
Start: 2023-01-19 | End: 2023-01-20 | Stop reason: HOSPADM

## 2023-01-19 RX ORDER — NALOXONE HCL 0.4 MG/ML
0.4 VIAL (ML) INJECTION
Status: DISCONTINUED | OUTPATIENT
Start: 2023-01-19 | End: 2023-01-20 | Stop reason: HOSPADM

## 2023-01-19 RX ORDER — METRONIDAZOLE 500 MG/100ML
500 INJECTION, SOLUTION INTRAVENOUS EVERY 8 HOURS
Status: DISCONTINUED | OUTPATIENT
Start: 2023-01-19 | End: 2023-01-19

## 2023-01-19 RX ORDER — CETIRIZINE HYDROCHLORIDE 10 MG/1
10 TABLET ORAL DAILY
Status: DISCONTINUED | OUTPATIENT
Start: 2023-01-19 | End: 2023-01-20 | Stop reason: HOSPADM

## 2023-01-19 RX ORDER — ONDANSETRON 2 MG/ML
4 INJECTION INTRAMUSCULAR; INTRAVENOUS EVERY 6 HOURS PRN
Status: DISCONTINUED | OUTPATIENT
Start: 2023-01-19 | End: 2023-01-20 | Stop reason: HOSPADM

## 2023-01-19 RX ORDER — SODIUM CHLORIDE, SODIUM LACTATE, POTASSIUM CHLORIDE, CALCIUM CHLORIDE 600; 310; 30; 20 MG/100ML; MG/100ML; MG/100ML; MG/100ML
100 INJECTION, SOLUTION INTRAVENOUS CONTINUOUS
Status: DISCONTINUED | OUTPATIENT
Start: 2023-01-19 | End: 2023-01-20 | Stop reason: HOSPADM

## 2023-01-19 RX ORDER — METRONIDAZOLE 500 MG/100ML
500 INJECTION, SOLUTION INTRAVENOUS EVERY 8 HOURS
Status: DISCONTINUED | OUTPATIENT
Start: 2023-01-19 | End: 2023-01-20 | Stop reason: HOSPADM

## 2023-01-19 RX ORDER — NITROGLYCERIN 0.4 MG/1
0.4 TABLET SUBLINGUAL
Status: DISCONTINUED | OUTPATIENT
Start: 2023-01-19 | End: 2023-01-20 | Stop reason: HOSPADM

## 2023-01-19 RX ORDER — LEVOFLOXACIN 5 MG/ML
750 INJECTION, SOLUTION INTRAVENOUS EVERY 24 HOURS
Status: DISCONTINUED | OUTPATIENT
Start: 2023-01-19 | End: 2023-01-20 | Stop reason: HOSPADM

## 2023-01-19 RX ORDER — MORPHINE SULFATE 2 MG/ML
2 INJECTION, SOLUTION INTRAMUSCULAR; INTRAVENOUS EVERY 4 HOURS PRN
Status: DISCONTINUED | OUTPATIENT
Start: 2023-01-19 | End: 2023-01-20 | Stop reason: HOSPADM

## 2023-01-19 RX ORDER — SODIUM CHLORIDE 0.9 % (FLUSH) 0.9 %
10 SYRINGE (ML) INJECTION AS NEEDED
Status: DISCONTINUED | OUTPATIENT
Start: 2023-01-19 | End: 2023-01-20 | Stop reason: HOSPADM

## 2023-01-19 RX ORDER — ACETAMINOPHEN 500 MG
500 TABLET ORAL EVERY 8 HOURS PRN
Status: DISCONTINUED | OUTPATIENT
Start: 2023-01-19 | End: 2023-01-20 | Stop reason: HOSPADM

## 2023-01-19 RX ORDER — SODIUM CHLORIDE 0.9 % (FLUSH) 0.9 %
10 SYRINGE (ML) INJECTION EVERY 12 HOURS SCHEDULED
Status: DISCONTINUED | OUTPATIENT
Start: 2023-01-19 | End: 2023-01-20 | Stop reason: HOSPADM

## 2023-01-19 RX ADMIN — METRONIDAZOLE 500 MG: 500 INJECTION, SOLUTION INTRAVENOUS at 23:22

## 2023-01-19 RX ADMIN — Medication 10 ML: at 21:18

## 2023-01-19 RX ADMIN — SODIUM CHLORIDE, POTASSIUM CHLORIDE, SODIUM LACTATE AND CALCIUM CHLORIDE 100 ML/HR: 600; 310; 30; 20 INJECTION, SOLUTION INTRAVENOUS at 00:46

## 2023-01-19 RX ADMIN — METRONIDAZOLE 500 MG: 500 INJECTION, SOLUTION INTRAVENOUS at 15:58

## 2023-01-19 RX ADMIN — METRONIDAZOLE 500 MG: 500 INJECTION, SOLUTION INTRAVENOUS at 06:18

## 2023-01-19 RX ADMIN — LEVOFLOXACIN 750 MG: 750 INJECTION, SOLUTION INTRAVENOUS at 21:17

## 2023-01-19 RX ADMIN — Medication 10 ML: at 00:43

## 2023-01-19 RX ADMIN — CETIRIZINE HYDROCHLORIDE 10 MG: 10 TABLET, FILM COATED ORAL at 22:01

## 2023-01-19 NOTE — CONSULTS
[facility]  Initial Inpatient Consult Note    Referring Provider: ER    Reason for Consultation: elevated liver enzymes    Subjective     History of present illness:    62 y.o. male with history of HCV s/p treatment with sustained virologic response (SVR), Reye syndrome, and fatty liver who presented with c/o abd pain.  Pt reports epigastric abd pain that started a couple days prior to presentation, severe, radiates to chest, associated nausea, vomiting.  No fever.  On presentation, labs WBC 6.2, Hgb 16.0, Plt 161, , , alk phos 147, TB 5.6.  CT abd/pelvis with mild diffuse hepatic steatosis, possible cirrhosis, no biliary dilatation, chronic calcified granulomatous disease.  MRCP with gallstones, possible acute cholecystitis, no biliary ductal dilatation or choledocholithiasis, diffuse hepatic steatosis.    Past Medical History:  Past Medical History:   Diagnosis Date   • Allergic rhinitis due to allergen 10/24/2016    well controlled    • Chronic hepatitis C without hepatic coma (HCC) 10/24/2016   • Chronic liver disease    • Colitis    • Diverticulitis    • Hepatitis    • Reye syndrome (HCC)    • Sinus trouble      Past Surgical History:  Past Surgical History:   Procedure Laterality Date   • COLONOSCOPY  2009,2012   • COLONOSCOPY N/A 10/18/2022    Procedure: COLONOSCOPY WITH POLYPECTOMY;  Surgeon: Niyah Durán MD;  Location: [facility] ENDOSCOPY;  Service: Gastroenterology;  Laterality: N/A;  COLON POLYPS, HEMORRHOIDS, DIVERTICULOSIS   • CYSTOSCOPY      and ureteroscopy with stent placement   • LIVER BIOPSY     • OTHER SURGICAL HISTORY  11/2009    sigmoid colectomy    • OTHER SURGICAL HISTORY  1966    scalp surgery, excision of lesion       Social History:   Social History     Tobacco Use   • Smoking status: Never   • Smokeless tobacco: Not on file   Substance Use Topics   • Alcohol use: Never      Family History:  Family History   Problem Relation Age of Onset   •  Heart disease Father    • Stroke Other    • Diabetes Other    • Malig Hyperthermia Neg Hx        Home Meds:  Medications Prior to Admission   Medication Sig Dispense Refill Last Dose   • fluticasone (FLONASE) 50 MCG/ACT nasal spray 2 sprays into the nostril(s) as directed by provider Daily.   1/18/2023   • levocetirizine (XYZAL) 5 MG tablet Take 5 mg by mouth Daily.   1/18/2023   • MILK THISTLE PO Take  by mouth.   1/18/2023   • multivitamin with minerals tablet tablet Take 1 tablet by mouth Daily.   1/18/2023   • vitamin D3 125 MCG (5000 UT) capsule capsule Take 5,000 Units by mouth Daily.   1/18/2023     Current Meds:   aspirin, 324 mg, Oral, Once  levoFLOXacin, 750 mg, Intravenous, Q24H  metroNIDAZOLE, 500 mg, Intravenous, Q8H  sodium chloride, 10 mL, Intravenous, Q12H      Allergies:  Allergies   Allergen Reactions   • Penicillins Itching   • Doxycycline Rash     Review of Systems  Pertinent items are noted in HPI, all other systems reviewed and negative     Objective     Vital Signs  Temp:  [98.1 °F (36.7 °C)-98.5 °F (36.9 °C)] 98.1 °F (36.7 °C)  Heart Rate:  [64-70] 70  Resp:  [17-20] 20  BP: (121-149)/(76-92) 133/88  Physical Exam:  General Appearance:    Alert, cooperative, in no acute distress   Head:    Normocephalic, without obvious abnormality, atraumatic   Eyes:          conjunctivae and sclerae normal   Throat:   no thrush, oral mucosa moist   Neck:   Supple, no adenopathy   Lungs:     Breathing unlabored    Heart:    No chest tenderness    Chest Wall:    No abnormalities observed   Abdomen:     Soft, nondistended, upper abd TTP   Extremities:   no edema, no redness   Skin:   No bruising or rash   Psychiatric:  normal mood and insight     Results Review:   I reviewed the patient's new clinical results.    Results from last 7 days   Lab Units 01/19/23  0539 01/18/23  1834   WBC 10*3/mm3 5.32 6.15   HEMOGLOBIN g/dL 15.5 16.0   HEMATOCRIT % 42.6 44.5   PLATELETS 10*3/mm3 147 161     Results from last 7  days   Lab Units 01/19/23  0539 01/18/23  1834   SODIUM mmol/L 139 139   POTASSIUM mmol/L 4.1 4.2   CHLORIDE mmol/L 104 100   CO2 mmol/L 23.7 29.3*   BUN mg/dL 9 12   CREATININE mg/dL 0.97 1.04   CALCIUM mg/dL 9.3 9.7   BILIRUBIN mg/dL 6.4* 5.6*   ALK PHOS U/L 154* 147*   ALT (SGPT) U/L 393* 482*   AST (SGOT) U/L 222* 362*   GLUCOSE mg/dL 103* 110*         Lab Results   Lab Value Date/Time    LIPASE 29 01/18/2023 1834    LIPASE 31 07/02/2020 0727       Radiology:  MRI abdomen wo contrast mrcp   Final Result       Gallstones are seen.  There is possible acute cholecystitis.  No biliary ductal dilatation or    choledocholithiasis is appreciated.  No acute pancreatitis.  There is diffuse hepatic steatosis.     There is slight motion artifact on some of the images.                 Please note that portions of this note were completed with a voice recognition program.       AURORA MONSON JR, MD          Electronically Signed and Approved By: AURORA MONSON JR, MD on 1/18/2023 at 22:30                               CT Abdomen Pelvis With Contrast   Final Result    Possible acute cholecystitis.  No definite gallstones are appreciated by CT.  No    biliary dilatation.  No choledocholithiasis is seen.  Please see above comments for further detail.                    Please note that portions of this note were completed with a voice recognition program.       AURORA MONSON JR, MD          Electronically Signed and Approved By: AURORA MONSON JR, MD on 1/18/2023 at 21:30                               XR Chest 1 View   Final Result    No acute cardiopulmonary abnormality.                        ARMOND SHEPPARD MD          Electronically Signed and Approved By: ARMOND SHEPPARD MD on 1/18/2023 at 19:05                               Assessment & Plan   Patient Active Problem List   Diagnosis   • Encounter for screening colonoscopy   • Abdominal pain, acute, epigastric   • Jaundice       Assessment:  1. Elevated liver  enzymes  2. Gallstones    Plan:  · MRCP negative for choledocholithiasis  · Would not recommend ERCP that this time given no biliary dilatation or signs of choledocholithiasis on MRCP  · Elevated liver enzymes can be suggestive of passed gallstone or underlying liver disease; previous liver enzymes normal  · Surgery following; d/w surgery      I discussed the patients findings and my recommendations with patient, family and consulting provider.    Niyah Durán MD

## 2023-01-19 NOTE — CONSULTS
[facility]   Consult    Patient Name: Angel Velasco  : 1960  MRN: [id number]  Primary Care Physician:  Ana Ho MD  Date of admission: 2023    Subjective   Subjective     Chief Complaint: Abdominal pain, subjective jaundice    HPI: Angel Velasco is a 62 y.o. male who presented to the emergency room last night with complaints of abdominal pain primarily in the upper abdomen with some nausea and vomiting.  He has a prior history of hepatitis C infection but has been treated and apparently is felt to be in remission.  He has had a work-up of his liver in the past that did not show significant fibrosis.  When he came to the emergency room last night he was noted to have a normal white blood cell count of 6000 with a normal differential.  He was noted to be jaundice with a total bilirubin of 5.6.  His alkaline phosphatase level was 147 and his ALT and AST were 482 and 362.  He had a CT scan that showed possible cirrhotic morphology of the liver and some gallbladder wall thickening.  He then had an MRI and MRCP that revealed no evidence of common bile duct stones or bile duct dilatation but did reveal some gallstones.  His LFTs were repeated this morning and his bilirubin level went up to 6.4 and his alkaline phosphatase level went up to 154.  His white blood cell count continues to be normal at 5.3.  He is feeling better and is not having any pain currently.    Review of Systems   Gastrointestinal: Positive for abdominal pain and vomiting.       Personal History     Past Medical History:   Diagnosis Date   • Allergic rhinitis due to allergen 10/24/2016    well controlled    • Chronic hepatitis C without hepatic coma (HCC) 10/24/2016   • Chronic liver disease    • Colitis    • Diverticulitis    • Hepatitis    • Reye syndrome (HCC)    • Sinus trouble        Past Surgical History:   Procedure Laterality Date   • COLONOSCOPY     • COLONOSCOPY N/A 10/18/2022    Procedure:  COLONOSCOPY WITH POLYPECTOMY;  Surgeon: Niyah Durán MD;  Location: [facility] ENDOSCOPY;  Service: Gastroenterology;  Laterality: N/A;  COLON POLYPS, HEMORRHOIDS, DIVERTICULOSIS   • CYSTOSCOPY      and ureteroscopy with stent placement   • LIVER BIOPSY     • OTHER SURGICAL HISTORY  11/2009    sigmoid colectomy    • OTHER SURGICAL HISTORY  1966    scalp surgery, excision of lesion        Family History: family history includes Diabetes in an other family member; Heart disease in his father; Stroke in an other family member. Otherwise pertinent FHx was reviewed and not pertinent to current issue.    Social History:  reports that he has never smoked. He does not have any smokeless tobacco history on file. He reports that he does not drink alcohol and does not use drugs.    Home Medications:  Milk Thistle, fluticasone, levocetirizine, multivitamin with minerals, and vitamin D3    Allergies:  Allergies   Allergen Reactions   • Penicillins Itching   • Doxycycline Rash       Objective    Objective     Vitals:   Temp:  [98.1 °F (36.7 °C)-98.5 °F (36.9 °C)] 98.1 °F (36.7 °C)  Heart Rate:  [64-70] 70  Resp:  [17-20] 20  BP: (121-149)/(76-92) 133/88    Physical Exam  Constitutional:       Appearance: Normal appearance.   HENT:      Head: Normocephalic.   Eyes:      General: Scleral icterus present.   Cardiovascular:      Rate and Rhythm: Normal rate.   Pulmonary:      Effort: Pulmonary effort is normal.   Abdominal:      Palpations: Abdomen is soft.      Tenderness: There is no abdominal tenderness.   Musculoskeletal:         General: Normal range of motion.      Cervical back: Normal range of motion.   Skin:     General: Skin is warm.   Neurological:      General: No focal deficit present.      Mental Status: He is alert.   Psychiatric:         Mood and Affect: Mood normal.         Result Review    Result Review:  I have personally reviewed the results from the time of this admission to 1/19/2023 14:13 EST and agree  with these findings:  [x]  Laboratory  []  Microbiology  [x]  Radiology  []  EKG/Telemetry   []  Cardiology/Vascular   []  Pathology  []  Old records  []  Other:  Most notable findings include: See above    Assessment & Plan   Assessment / Plan     Brief Patient Summary:  Angel Velasco is a 62 y.o. male who presented with epigastric pain and jaundice.  He is jaundiced with a bilirubin of over 6 and his other liver function tests are also mildly elevated.  He does have a prior history of hepatitis C but it had a work-up in the past that did not show significant liver fibrosis.  He does have gallstones but clinically does not appear to have acute cholecystitis as he is currently not having any pain and his white blood cell count is completely normal.     Active Hospital Problems:  Active Hospital Problems    Diagnosis    • **Abdominal pain, acute, epigastric    • Jaundice        Plan: I will let him go ahead and have a diet this evening.  I am hesitant to proceed with a cholecystectomy as he appears to have new jaundice that at this point seems to be of an uncertain etiology.  I will go ahead and reorder some lab work for the morning.  If he continues to be jaundiced I will consult with his gastroenterologist Dr. Durán and will make further plans at that time as to how to work-up this new liver issue.    DVT prophylaxis:  Mechanical DVT prophylaxis orders are present.    CODE STATUS:    Code Status (Patient has no pulse and is not breathing): CPR (Attempt to Resuscitate)  Medical Interventions (Patient has pulse or is breathing): Full Support      Admission Status:  I believe this patient meets inpatient status.    Electronically signed by Blayne Hoyos MD, 01/19/23, 2:07 PM EST.

## 2023-01-19 NOTE — SIGNIFICANT NOTE
01/19/23 1045   Plan   Plan Patient reports lives with spouse that can assist if needed.  Patient on antibiotics and will have a MCRP.  Drives and provides own IADL's.  Facesheet verified.  Patient plans to return hme with spouse and no needs at this time.

## 2023-01-19 NOTE — ED NOTES
Patient out of room to CT and MRI during time repeat EKG is due. Physician states repeat EKG is not necessary.

## 2023-01-20 ENCOUNTER — READMISSION MANAGEMENT (OUTPATIENT)
Dept: CALL CENTER | Facility: HOSPITAL | Age: 63
End: 2023-01-20
Payer: COMMERCIAL

## 2023-01-20 VITALS
SYSTOLIC BLOOD PRESSURE: 129 MMHG | HEART RATE: 76 BPM | DIASTOLIC BLOOD PRESSURE: 80 MMHG | OXYGEN SATURATION: 96 % | TEMPERATURE: 97.6 F | BODY MASS INDEX: 24.34 KG/M2 | RESPIRATION RATE: 18 BRPM | HEIGHT: 72 IN | WEIGHT: 179.68 LBS

## 2023-01-20 LAB
ALBUMIN SERPL-MCNC: 4.2 G/DL (ref 3.5–5.2)
ALBUMIN/GLOB SERPL: 1.8 G/DL
ALP SERPL-CCNC: 146 U/L (ref 39–117)
ALT SERPL W P-5'-P-CCNC: 255 U/L (ref 1–41)
ANION GAP SERPL CALCULATED.3IONS-SCNC: 7.5 MMOL/L (ref 5–15)
AST SERPL-CCNC: 89 U/L (ref 1–40)
BASOPHILS # BLD AUTO: 0.02 10*3/MM3 (ref 0–0.2)
BASOPHILS NFR BLD AUTO: 0.4 % (ref 0–1.5)
BILIRUB SERPL-MCNC: 1.9 MG/DL (ref 0–1.2)
BUN SERPL-MCNC: 11 MG/DL (ref 8–23)
BUN/CREAT SERPL: 10.2 (ref 7–25)
CALCIUM SPEC-SCNC: 9.4 MG/DL (ref 8.6–10.5)
CHLORIDE SERPL-SCNC: 105 MMOL/L (ref 98–107)
CO2 SERPL-SCNC: 26.5 MMOL/L (ref 22–29)
CREAT SERPL-MCNC: 1.08 MG/DL (ref 0.76–1.27)
DEPRECATED RDW RBC AUTO: 38.5 FL (ref 37–54)
EGFRCR SERPLBLD CKD-EPI 2021: 78.6 ML/MIN/1.73
EOSINOPHIL # BLD AUTO: 0.26 10*3/MM3 (ref 0–0.4)
EOSINOPHIL NFR BLD AUTO: 5.3 % (ref 0.3–6.2)
ERYTHROCYTE [DISTWIDTH] IN BLOOD BY AUTOMATED COUNT: 11.9 % (ref 12.3–15.4)
GLOBULIN UR ELPH-MCNC: 2.4 GM/DL
GLUCOSE SERPL-MCNC: 103 MG/DL (ref 65–99)
HCT VFR BLD AUTO: 41.7 % (ref 37.5–51)
HGB BLD-MCNC: 15 G/DL (ref 13–17.7)
IMM GRANULOCYTES # BLD AUTO: 0.01 10*3/MM3 (ref 0–0.05)
IMM GRANULOCYTES NFR BLD AUTO: 0.2 % (ref 0–0.5)
LYMPHOCYTES # BLD AUTO: 1.62 10*3/MM3 (ref 0.7–3.1)
LYMPHOCYTES NFR BLD AUTO: 33 % (ref 19.6–45.3)
MCH RBC QN AUTO: 32.2 PG (ref 26.6–33)
MCHC RBC AUTO-ENTMCNC: 36 G/DL (ref 31.5–35.7)
MCV RBC AUTO: 89.5 FL (ref 79–97)
MONOCYTES # BLD AUTO: 0.43 10*3/MM3 (ref 0.1–0.9)
MONOCYTES NFR BLD AUTO: 8.8 % (ref 5–12)
NEUTROPHILS NFR BLD AUTO: 2.57 10*3/MM3 (ref 1.7–7)
NEUTROPHILS NFR BLD AUTO: 52.3 % (ref 42.7–76)
NRBC BLD AUTO-RTO: 0 /100 WBC (ref 0–0.2)
PLATELET # BLD AUTO: 150 10*3/MM3 (ref 140–450)
PMV BLD AUTO: 9.6 FL (ref 6–12)
POTASSIUM SERPL-SCNC: 4.4 MMOL/L (ref 3.5–5.2)
PROT SERPL-MCNC: 6.6 G/DL (ref 6–8.5)
RBC # BLD AUTO: 4.66 10*6/MM3 (ref 4.14–5.8)
SODIUM SERPL-SCNC: 139 MMOL/L (ref 136–145)
WBC NRBC COR # BLD: 4.91 10*3/MM3 (ref 3.4–10.8)

## 2023-01-20 PROCEDURE — 99239 HOSP IP/OBS DSCHRG MGMT >30: CPT | Performed by: STUDENT IN AN ORGANIZED HEALTH CARE EDUCATION/TRAINING PROGRAM

## 2023-01-20 PROCEDURE — 99231 SBSQ HOSP IP/OBS SF/LOW 25: CPT | Performed by: SURGERY

## 2023-01-20 PROCEDURE — 25810000003 LACTATED RINGERS PER 1000 ML: Performed by: INTERNAL MEDICINE

## 2023-01-20 PROCEDURE — 25010000002 METRONIDAZOLE 500 MG/100ML SOLUTION: Performed by: INTERNAL MEDICINE

## 2023-01-20 PROCEDURE — 85025 COMPLETE CBC W/AUTO DIFF WBC: CPT | Performed by: SURGERY

## 2023-01-20 PROCEDURE — 80053 COMPREHEN METABOLIC PANEL: CPT | Performed by: SURGERY

## 2023-01-20 RX ORDER — FLUTICASONE PROPIONATE 50 MCG
2 SPRAY, SUSPENSION (ML) NASAL DAILY
Status: DISCONTINUED | OUTPATIENT
Start: 2023-01-20 | End: 2023-01-20 | Stop reason: HOSPADM

## 2023-01-20 RX ORDER — LEVOFLOXACIN 500 MG/1
500 TABLET, FILM COATED ORAL DAILY
Qty: 5 TABLET | Refills: 0 | Status: SHIPPED | OUTPATIENT
Start: 2023-01-20 | End: 2023-01-25

## 2023-01-20 RX ORDER — METRONIDAZOLE 500 MG/1
500 TABLET ORAL 3 TIMES DAILY
Qty: 15 TABLET | Refills: 0 | Status: SHIPPED | OUTPATIENT
Start: 2023-01-20 | End: 2023-01-25

## 2023-01-20 RX ADMIN — FLUTICASONE PROPIONATE 2 SPRAY: 50 SPRAY, METERED NASAL at 08:38

## 2023-01-20 RX ADMIN — SODIUM CHLORIDE, POTASSIUM CHLORIDE, SODIUM LACTATE AND CALCIUM CHLORIDE 100 ML/HR: 600; 310; 30; 20 INJECTION, SOLUTION INTRAVENOUS at 02:36

## 2023-01-20 RX ADMIN — METRONIDAZOLE 500 MG: 500 INJECTION, SOLUTION INTRAVENOUS at 07:47

## 2023-01-20 RX ADMIN — Medication 10 ML: at 08:38

## 2023-01-20 NOTE — PLAN OF CARE
Goal Outcome Evaluation:  Plan of Care Reviewed With: patient        Progress: improving  Outcome Evaluation: PT WITHOUT ANY COMPLAINTS LAST NIGHT OTHER THAN HIS HANDS ITCHING, WHICH WE GOT HIS HOME ALLERGY MED RE-ORDERED.Stephania Esteban RN

## 2023-01-20 NOTE — PROGRESS NOTES
[facility]     Progress Note    Patient Name: Angel Velasco  : 1960  MRN: [id number]  Primary Care Physician:  Ana Ho MD  Date of admission: 2023    Subjective   Subjective     HPI:  Patient Reports feeling better today.  He tolerated diet yesterday without difficulty.  His lab work is better his bilirubin is down to 1.9.    Review of Systems    Objective   Objective     Vitals:   Temp:  [97.6 °F (36.4 °C)-98.6 °F (37 °C)] 97.6 °F (36.4 °C)  Heart Rate:  [63-77] 76  Resp:  [18-20] 18  BP: (112-133)/(75-88) 129/80    Physical Exam   He appears well.  His abdomen is soft.  Meds:  Scheduled Meds:aspirin, 324 mg, Oral, Once  cetirizine, 10 mg, Oral, Daily  fluticasone, 2 spray, Nasal, Daily  levoFLOXacin, 750 mg, Intravenous, Q24H  metroNIDAZOLE, 500 mg, Intravenous, Q8H  sodium chloride, 10 mL, Intravenous, Q12H      Continuous Infusions:lactated ringers, 100 mL/hr, Last Rate: 100 mL/hr (23 0236)       PRN Meds:.•  acetaminophen  •  Morphine **AND** naloxone  •  nitroglycerin  •  ondansetron  •  sodium chloride  •  [COMPLETED] Insert Peripheral IV **AND** sodium chloride  •  sodium chloride  •  sodium chloride     Result Review    Result Review:  I have personally reviewed the results from the time of this admission to 2023 08:45 EST and agree with these findings:  [x]  Laboratory  []  Microbiology  []  Radiology  []  EKG/Telemetry   []  Cardiology/Vascular   []  Pathology  []  Old records  []  Other:  Most notable findings include:     Assessment & Plan   Assessment / Plan     Brief Patient Summary:  Angel Velasco is a 62 y.o. male who is doing better.  His liver function tests look better and his abdominal pain is improved.  He is now tolerating diet okay.    Active Hospital Problems:  Active Hospital Problems    Diagnosis    • **Abdominal pain, acute, epigastric    • Jaundice        Plan:   My preference would be to let him be discharged to home and let this  episode continue to improve.  I will see him back in the office this coming week and I will talk to him about possibly going ahead and electively removing his gallbladder.    DVT prophylaxis:  Mechanical DVT prophylaxis orders are present.    CODE STATUS:    Code Status (Patient has no pulse and is not breathing): CPR (Attempt to Resuscitate)  Medical Interventions (Patient has pulse or is breathing): Full Support      Electronically signed by Blayne Hoyos MD, 01/20/23, 8:45 AM EST.

## 2023-01-20 NOTE — DISCHARGE SUMMARY
[facility]         HOSPITALIST  DISCHARGE SUMMARY    Patient Name: Angel Velasco  : 1960  MRN: [id number]    Date of Admission: 2023  Date of Discharge: 2023  Primary Care Physician: Ana Ho MD    Consults     Date and Time Order Name Status Description    2023 12:26 AM Inpatient General Surgery Consult      2023 12:26 AM Inpatient Gastroenterology Consult Completed     2023 10:55 PM Surgery (on-call MD unless specified)      2023 10:26 PM Hospitalist (on-call MD unless specified)      2023  7:57 PM Gastroenterology (on-call MD unless specified) Completed           Active and Resolved Hospital Problems:  Active Hospital Problems    Diagnosis POA   • **Abdominal pain, acute, epigastric [R10.13] Yes   • Jaundice [R17] Unknown      Resolved Hospital Problems   No resolved problems to display.       Hospital Course     Hospital Course:  Angel Velasco is a 62 y.o. male who presented with abdominal pain and subjective jaundice.  Upon initial presentation patient was noted to have elevated bilirubin of 5.6.  He also had an elevated alkaline phosphatase of 147 and AST and ALT of 362 and 42 respectively.  He had imaging that included a CT that showed possible cirrhotic morphology of the liver and some gallbladder wall thickening.  He then had MRCP that revealed evidence of no common bile duct stones or bile duct dilatation.  There was mention of some gallstones.  It was recommended patient continue with antibiotic therapy and supportive measures.  Gastroenterology was also consulted given concerns for possible choledocholithiasis however this was not the case.  It was recommended patient not have any procedures at this time.  Discussed with general surgery and patient will follow-up as outpatient for further management including possible cholecystectomy at some point.  He will continue his other home medications as before.  He will be  discharged with Levaquin and Flagyl.  He was discharged in stable condition.      Discharge problem list:  Transaminitis  Cholelithiasis  Jaundice  Cholecystitis      DISCHARGE Follow Up Recommendations for labs and diagnostics: PCP 1 week.  General surgery 1 week.      Day of Discharge     Vital Signs:  Temp:  [97.6 °F (36.4 °C)-98.6 °F (37 °C)] 97.6 °F (36.4 °C)  Heart Rate:  [63-77] 76  Resp:  [18-20] 18  BP: (112-133)/(75-88) 129/80  Physical Exam:   Constitutional: Alert, awake, no acute distress  HEENT:  PERRLA, EOMI; No Scleral icterus  Neck:  Supple; No Mass, No Lymphadenopathy  Cardiovascular:  No Rubs, No Edema, No JVD  Respiratory: No respiratory distress, unlabored breathing, saturating well on room air  Abdomen:  Normal BS all 4 Quadrants; No Guarding, No Tenderness  Musculoskeletal:  Pulses Positive all 4 Ext; No Cyanosis, No Edema  Neurological:  Alert+Ox3, Mental status WNL; No Dysarthria  Skin:  No Rash, No Cellulitis, No Erythema      Discharge Details        Discharge Medications      New Medications      Instructions Start Date   levoFLOXacin 500 MG tablet  Commonly known as: Levaquin   500 mg, Oral, Daily      metroNIDAZOLE 500 MG tablet  Commonly known as: Flagyl   500 mg, Oral, 3 Times Daily         Continue These Medications      Instructions Start Date   fluticasone 50 MCG/ACT nasal spray  Commonly known as: FLONASE   2 sprays, Nasal, Daily      levocetirizine 5 MG tablet  Commonly known as: XYZAL   5 mg, Oral, Daily      MILK THISTLE PO   Oral      multivitamin with minerals tablet tablet   1 tablet, Oral, Daily      vitamin D3 125 MCG (5000 UT) capsule capsule   5,000 Units, Oral, Daily             Allergies   Allergen Reactions   • Penicillins Itching   • Doxycycline Rash       Discharge Disposition:  Home or Self Care    Diet:  Hospital:  Diet Order   Procedures   • Diet: Regular/House Diet; Texture: Regular Texture (IDDSI 7); Fluid Consistency: Thin (IDDSI 0)       Discharge Activity:        CODE STATUS:  Code Status and Medical Interventions:   Ordered at: 01/18/23 2232     Code Status (Patient has no pulse and is not breathing):    CPR (Attempt to Resuscitate)     Medical Interventions (Patient has pulse or is breathing):    Full Support         Future Appointments   Date Time Provider Department Center   2/24/2023  3:15 PM Ana Ho MD [facility]       Additional Instructions for the Follow-ups that You Need to Schedule     Discharge Follow-up with PCP   As directed       Currently Documented PCP:    Ana Ho MD    PCP Phone Number:    [phone]     Follow Up Details: 1 wk         Discharge Follow-up with Specified Provider: General surgery, Dr. Hoyos, 1 wk   As directed      To: General surgeryDr. Hoyos, 1 wk               Pertinent  and/or Most Recent Results     PROCEDURES:   CT Abdomen Pelvis With Contrast    Result Date: 1/18/2023  Narrative: PROCEDURE: CT ABDOMEN PELVIS W CONTRAST  COMPARISON: 7/2/2020.  INDICATIONS: Evaluate for possible biliary obstruction; h/o elevated LFTs & vomiting; upper abdominal pain; gallstones have been reported on prior ultrasound examinations of the abdomen.  TECHNIQUE: After obtaining the patient's consent, 501 CT images were created with non-ionic intravenous contrast material.   PROTOCOL:   Standard imaging protocol performed    RADIATION:   DLP: 309 mGy*cm   Automated exposure control was utilized to minimize radiation dose. CONTRAST: 100 cc Isovue 370 I.V. LABS:   eGFR: >60ml/min/1.73m2  FINDINGS: There is mild diffuse hepatic steatosis.  There is possible cirrhosis.  No hepatomegaly.  No splenomegaly.  The main portal vein is patent.  Mild age-indeterminate inflammatory changes are seen about the gallbladder.  No definite gallstones are seen by CT.  No biliary ductal dilatation is appreciated.  No pneumobilia.  No acute pancreatitis.  Chronic calcified granulomatous disease involves the abdomen.  No adrenal mass.   No ascites.  No acute intraperitoneal or retroperitoneal hemorrhage.  There has been partial resection of the distal colon.  The patient has undergone appendectomy, as well.  No acute colitis or diverticulitis or enteritis.  No mechanical bowel obstruction or pneumoperitoneum.  No pneumatosis.  Degenerative changes involve the imaged spine and the bilateral sacroiliac joints.  No acute fracture or aggressive osseous lesion.  There is bilateral L5 spondylolysis with grade 1 spondylolisthesis of L5 upon S1, estimated 7 mm or slightly less.  There is asymmetry of the posterior elements at the L5-S1 level.  The lung bases are clear of acute infiltrate.  Small bilateral fat containing inguinal hernias are seen, which measure about 3.2 cm in greatest diameter.  They do not contain bowel.  A tiny fat-containing umbilical hernia is seen.  It does not contain bowel.       Impression:  Possible acute cholecystitis.  No definite gallstones are appreciated by CT.  No biliary dilatation.  No choledocholithiasis is seen.  Please see above comments for further detail.     Please note that portions of this note were completed with a voice recognition program.  AURORA MONSON JR, MD       Electronically Signed and Approved By: AURORA MONSON JR, MD on 1/18/2023 at 21:30              XR Chest 1 View    Result Date: 1/18/2023  Narrative: PROCEDURE: XR CHEST 1 VW  COMPARISON: None  INDICATIONS: Chest Pain Triage Protocol  FINDINGS:  Lungs normally expanded.  No pneumothorax or pleural effusion.  No focal pulmonary parenchymal opacity.  Pulmonary vessels are distinct.  Bones and soft tissues are normal.      Impression:  No acute cardiopulmonary abnormality.       ARMOND SHEPPARD MD       Electronically Signed and Approved By: ARMOND SHEPPARD MD on 1/18/2023 at 19:05             MRI abdomen wo contrast mrcp    Result Date: 1/18/2023  Narrative: PROCEDURE: MRI ABDOMEN WO CONTRAST MRCP  COMPARISON: 1/18/2023.  INDICATIONS:  Possible biliary  ductal obstruction; epigastric abdominal pain for 3 days, especially after eating.  TECHNIQUE:  A comprehensive examination was performed utilizing a variety of imaging planes and imaging parameters to optimize visualization of suspected pathology.  Magnetic resonance cholangiopancreatography (MRCP) was also performed.  Six hundred ten (610) magnetic resonance (MR) images were obtained.  FINDINGS:  Gallstones are seen.  A 2 cm gallstone may be impacted in the gallbladder neck.  There is possible acute cholecystitis.  No biliary ductal dilatation or choledocholithiasis.  There is diffuse hepatic steatosis with borderline hepatomegaly.  No splenomegaly.  No acute pancreatitis is suggested.  No definite pancreatic mass is seen.  Nonenhanced MRI examination may be limited in detection of an underlying pancreatic mass.  No pancreatic ductal dilatation is seen.  There is slight motion artifact on the exam.  There is a suspected benign 2 cm lower pole parapelvic left renal cyst.      Impression:   Gallstones are seen.  There is possible acute cholecystitis.  No biliary ductal dilatation or choledocholithiasis is appreciated.  No acute pancreatitis.  There is diffuse hepatic steatosis.  There is slight motion artifact on some of the images.     Please note that portions of this note were completed with a voice recognition program.  AURORA MONSON JR, MD       Electronically Signed and Approved By: AURORA MONSON JR, MD on 1/18/2023 at 22:30                  LAB RESULTS:      Lab 01/20/23  0457 01/19/23  0539 01/18/23  1834   WBC 4.91 5.32 6.15   HEMOGLOBIN 15.0 15.5 16.0   HEMATOCRIT 41.7 42.6 44.5   PLATELETS 150 147 161   NEUTROS ABS 2.57 3.09 3.51   IMMATURE GRANS (ABS) 0.01 0.01 0.02   LYMPHS ABS 1.62 1.48 1.85   MONOS ABS 0.43 0.56 0.62   EOS ABS 0.26 0.16 0.13   MCV 89.5 88.8 88.1         Lab 01/20/23  0457 01/19/23  0539 01/18/23  1834   SODIUM 139 139 139   POTASSIUM 4.4 4.1 4.2   CHLORIDE 105 104 100   CO2 26.5  23.7 29.3*   ANION GAP 7.5 11.3 9.7   BUN 11 9 12   CREATININE 1.08 0.97 1.04   EGFR 78.6 89.4 82.2   GLUCOSE 103* 103* 110*   CALCIUM 9.4 9.3 9.7   MAGNESIUM  --   --  2.3         Lab 01/20/23  0457 01/19/23  0539 01/18/23  1834   TOTAL PROTEIN 6.6 6.7 7.4   ALBUMIN 4.2 4.3 4.9   GLOBULIN 2.4 2.4 2.5   ALT (SGPT) 255* 393* 482*   AST (SGOT) 89* 222* 362*   BILIRUBIN 1.9* 6.4* 5.6*   ALK PHOS 146* 154* 147*   LIPASE  --   --  29         Lab 01/18/23  1834   PROBNP 72.9                 Brief Urine Lab Results     None        Microbiology Results (last 10 days)     ** No results found for the last 240 hours. **          CT Abdomen Pelvis With Contrast    Result Date: 1/18/2023  Impression:  Possible acute cholecystitis.  No definite gallstones are appreciated by CT.  No biliary dilatation.  No choledocholithiasis is seen.  Please see above comments for further detail.     Please note that portions of this note were completed with a voice recognition program.  AURORA MONSON JR, MD       Electronically Signed and Approved By: AURORA MONSON JR, MD on 1/18/2023 at 21:30              XR Chest 1 View    Result Date: 1/18/2023  Impression:  No acute cardiopulmonary abnormality.       ARMOND SHEPPARD MD       Electronically Signed and Approved By: ARMOND SHEPPARD MD on 1/18/2023 at 19:05             MRI abdomen wo contrast mrcp    Result Date: 1/18/2023  Impression:   Gallstones are seen.  There is possible acute cholecystitis.  No biliary ductal dilatation or choledocholithiasis is appreciated.  No acute pancreatitis.  There is diffuse hepatic steatosis.  There is slight motion artifact on some of the images.     Please note that portions of this note were completed with a voice recognition program.  AURORA MONSON JR, MD       Electronically Signed and Approved By: AURORA MONSON JR, MD on 1/18/2023 at 22:30                            Labs Pending at Discharge:        Time spent on Discharge including face to face service:  32  minutes    Electronically signed by Manny You DO, 01/20/23, 9:28 AM EST.

## 2023-01-20 NOTE — PROGRESS NOTES
[facility]   Hospitalist Progress Note  Date: 2023  Patient Name: Angel Velsaco  : 1960  MRN: [id number]  Date of admission: 2023      Subjective   Subjective     Chief Complaint: abdominal pain    Summary: 62 y.o. male past medical history of hepatitis C that presents to the emergency department for evaluation of abdominal pain.  He describes it as epigastric, sharp, intermittent, worse with eating, radiates up into the chest.  He endorses associated nausea and vomiting.  He denies any fevers, chills, sweats, shortness of breath, palpitations, diarrhea constipation, dysuria, weakness, rash.  In the emergency department work-up reveals elevated LFTs including a bilirubin greater than 5.  CT of the abdomen reveals possible cholecystitis no biliary ductal dilation.  GI was called and will evaluate the patient.  Patient also underwent MRCP subsequently which revealed gallstones but no obvious choledocholithiasis.  This also reported possible cholecystitis.  Patient has been started on Levaquin and Flagyl and will be admitted for ongoing monitoring management further GI and surgery evaluation.    Interval Followup: patient's pain improving but still present.  His main concern is being NPO       Objective   Objective     Vitals:   Temp:  [97.9 °F (36.6 °C)-98.6 °F (37 °C)] 98.6 °F (37 °C)  Heart Rate:  [64-77] 77  Resp:  [17-20] 20  BP: (121-139)/(76-92) 123/76  Physical Exam    Constitutional: Awake, alert, no acute distress   Eyes: Pupils equal, sclerae anicteric, no conjunctival injection   HENT: NCAT, mucous membranes moist   Neck: Supple, no thyromegaly, no lymphadenopathy, trachea midline   Respiratory: Clear to auscultation bilaterally, nonlabored respirations    Cardiovascular: RRR, no murmurs, rubs, or gallops, palpable pedal pulses bilaterally   Gastrointestinal: Positive bowel sounds, soft, nontender, nondistended   Musculoskeletal: No bilateral ankle edema, no clubbing or cyanosis  to extremities   Psychiatric: Appropriate affect, cooperative   Neurologic: Oriented x 3, strength symmetric in all extremities, Cranial Nerves grossly intact to confrontation, speech clear   Skin: No rashes         Assessment & Plan   Assessment / Plan     Assessment/Plan:  Suspected cholecystitis  Chronic Hepatitis C  Possible cirrhosis      Remain Inpatient   GI called from the emergency department due to obstructive pattern of LFTs.  MRCP ordered which reports gallstones present with possible cholecystitis.    Patient most likely has acute cholecystitis with underlying cirrhosis.  Does not appear to have choledocholithiasis.   Continue Levaquin and Flagyl.    Discussed with Surgery and GI    Supportive care and pain control.    Advance diet as tolerated    Discussed plan with RN.    DVT prophylaxis:  Mechanical DVT prophylaxis orders are present.    CODE STATUS:   Code Status (Patient has no pulse and is not breathing): CPR (Attempt to Resuscitate)  Medical Interventions (Patient has pulse or is breathing): Full Support

## 2023-01-21 NOTE — OUTREACH NOTE
Prep Survey    Flowsheet Row Responses   [facility] facility patient discharged from? [facility]   Is LACE score < 7 ? Yes   Eligibility TCM   Discharge diagnosis Abdominal pain, acute, epigastric   Does the patient have one of the following disease processes/diagnoses(primary or secondary)? Other   Does the patient have Home health ordered? No   Is there a DME ordered? No   Prep survey completed? Yes          BONNIE AL - Registered Nurse

## 2023-01-23 ENCOUNTER — TRANSITIONAL CARE MANAGEMENT TELEPHONE ENCOUNTER (OUTPATIENT)
Dept: CALL CENTER | Facility: HOSPITAL | Age: 63
End: 2023-01-23
Payer: COMMERCIAL

## 2023-01-23 NOTE — OUTREACH NOTE
Call Center TCM Note    Flowsheet Row Responses   [facility] patient discharged from? [facility]   Does the patient have one of the following disease processes/diagnoses(primary or secondary)? Other   TCM attempt successful? Yes  [no verbal release but CM notes indicate wife active with POC]   Call start time 0831   Call end time 0835   Discharge diagnosis Abdominal pain, acute, epigastric   Meds reviewed with patient/caregiver? Yes   Is the patient having any side effects they believe may be caused by any medication additions or changes? No   Does the patient have all medications ordered at discharge? Yes   Is the patient taking all medications as directed (includes completed medication regime)? Yes   Medication comments Taking ATBs as ordered   Comments PCP FOLLOW UP APPOINTMENT IS 1/27/23@215pm. Dr. Hoyos on 1/27/23@1045am   Does the patient have an appointment with their PCP within 7 days of discharge? Yes   Has home health visited the patient within 72 hours of discharge? N/A   Psychosocial issues? No   Did the patient receive a copy of their discharge instructions? Yes   Nursing interventions Reviewed instructions with patient   What is the patient's perception of their health status since discharge? Improving  [Reports jaundice has improved, denies abdominal pain, feels some tightness that he relates to atb use, he is tired, tolerating po intake, no n/v , bowel and bladder elimination w/o issues, no fever]   Is the patient/caregiver able to teach back signs and symptoms related to disease process for when to call PCP? Yes   Is the patient/caregiver able to teach back signs and symptoms related to disease process for when to call 911? Yes   TCM call completed? Yes   Call end time 0835          Joy Bragg RN    1/23/2023, 08:37 EST

## 2023-01-27 ENCOUNTER — PREP FOR SURGERY (OUTPATIENT)
Dept: OTHER | Facility: HOSPITAL | Age: 63
End: 2023-01-27
Payer: COMMERCIAL

## 2023-01-27 ENCOUNTER — OFFICE VISIT (OUTPATIENT)
Dept: INTERNAL MEDICINE | Facility: CLINIC | Age: 63
End: 2023-01-27
Payer: COMMERCIAL

## 2023-01-27 ENCOUNTER — OFFICE VISIT (OUTPATIENT)
Dept: SURGERY | Facility: CLINIC | Age: 63
End: 2023-01-27
Payer: COMMERCIAL

## 2023-01-27 VITALS — HEIGHT: 72 IN | RESPIRATION RATE: 14 BRPM | WEIGHT: 180.78 LBS | BODY MASS INDEX: 24.49 KG/M2

## 2023-01-27 VITALS
HEART RATE: 71 BPM | DIASTOLIC BLOOD PRESSURE: 80 MMHG | WEIGHT: 168.6 LBS | SYSTOLIC BLOOD PRESSURE: 130 MMHG | HEIGHT: 72 IN | BODY MASS INDEX: 22.84 KG/M2 | OXYGEN SATURATION: 99 %

## 2023-01-27 DIAGNOSIS — Z12.5 SCREENING FOR PROSTATE CANCER: ICD-10-CM

## 2023-01-27 DIAGNOSIS — Z13.220 SCREENING, LIPID: ICD-10-CM

## 2023-01-27 DIAGNOSIS — K80.20 CALCULUS OF GALLBLADDER WITHOUT CHOLECYSTITIS WITHOUT OBSTRUCTION: Primary | ICD-10-CM

## 2023-01-27 DIAGNOSIS — K80.20 CALCULUS OF GALLBLADDER WITHOUT CHOLECYSTITIS WITHOUT OBSTRUCTION: ICD-10-CM

## 2023-01-27 DIAGNOSIS — Z00.00 ANNUAL PHYSICAL EXAM: Primary | ICD-10-CM

## 2023-01-27 DIAGNOSIS — E55.9 VITAMIN D DEFICIENCY: ICD-10-CM

## 2023-01-27 DIAGNOSIS — R74.8 ELEVATED LIVER ENZYMES: ICD-10-CM

## 2023-01-27 LAB
25(OH)D3 SERPL-MCNC: 75.8 NG/ML (ref 30–100)
ALBUMIN SERPL-MCNC: 5 G/DL (ref 3.5–5.2)
ALBUMIN/GLOB SERPL: 2.6 G/DL
ALP SERPL-CCNC: 114 U/L (ref 39–117)
ALT SERPL W P-5'-P-CCNC: 135 U/L (ref 1–41)
ANION GAP SERPL CALCULATED.3IONS-SCNC: 7 MMOL/L (ref 5–15)
AST SERPL-CCNC: 48 U/L (ref 1–40)
BILIRUB SERPL-MCNC: 0.8 MG/DL (ref 0–1.2)
BUN SERPL-MCNC: 18 MG/DL (ref 8–23)
BUN/CREAT SERPL: 19.1 (ref 7–25)
CALCIUM SPEC-SCNC: 9.7 MG/DL (ref 8.6–10.5)
CHLORIDE SERPL-SCNC: 102 MMOL/L (ref 98–107)
CHOLEST SERPL-MCNC: 132 MG/DL (ref 0–200)
CO2 SERPL-SCNC: 30 MMOL/L (ref 22–29)
CREAT SERPL-MCNC: 0.94 MG/DL (ref 0.76–1.27)
EGFRCR SERPLBLD CKD-EPI 2021: 92.8 ML/MIN/1.73
GLOBULIN UR ELPH-MCNC: 1.9 GM/DL
GLUCOSE SERPL-MCNC: 97 MG/DL (ref 65–99)
HDLC SERPL-MCNC: 40 MG/DL (ref 40–60)
LDLC SERPL CALC-MCNC: 51 MG/DL (ref 0–100)
LDLC/HDLC SERPL: 0.99 {RATIO}
POTASSIUM SERPL-SCNC: 4.1 MMOL/L (ref 3.5–5.2)
PROT SERPL-MCNC: 6.9 G/DL (ref 6–8.5)
PSA SERPL-MCNC: 2.03 NG/ML (ref 0–4)
SODIUM SERPL-SCNC: 139 MMOL/L (ref 136–145)
TRIGL SERPL-MCNC: 263 MG/DL (ref 0–150)
VLDLC SERPL-MCNC: 41 MG/DL (ref 5–40)

## 2023-01-27 PROCEDURE — 90471 IMMUNIZATION ADMIN: CPT | Performed by: INTERNAL MEDICINE

## 2023-01-27 PROCEDURE — 90750 HZV VACC RECOMBINANT IM: CPT | Performed by: INTERNAL MEDICINE

## 2023-01-27 PROCEDURE — 82306 VITAMIN D 25 HYDROXY: CPT | Performed by: INTERNAL MEDICINE

## 2023-01-27 PROCEDURE — 80061 LIPID PANEL: CPT | Performed by: INTERNAL MEDICINE

## 2023-01-27 PROCEDURE — 99396 PREV VISIT EST AGE 40-64: CPT | Performed by: INTERNAL MEDICINE

## 2023-01-27 PROCEDURE — G0103 PSA SCREENING: HCPCS | Performed by: INTERNAL MEDICINE

## 2023-01-27 PROCEDURE — 80053 COMPREHEN METABOLIC PANEL: CPT | Performed by: INTERNAL MEDICINE

## 2023-01-27 PROCEDURE — 99214 OFFICE O/P EST MOD 30 MIN: CPT | Performed by: SURGERY

## 2023-01-27 RX ORDER — INDOCYANINE GREEN AND WATER 25 MG
1.25 KIT INJECTION ONCE
Status: CANCELLED | OUTPATIENT
Start: 2023-01-27 | End: 2023-01-27

## 2023-01-27 RX ORDER — SODIUM CHLORIDE, SODIUM LACTATE, POTASSIUM CHLORIDE, CALCIUM CHLORIDE 600; 310; 30; 20 MG/100ML; MG/100ML; MG/100ML; MG/100ML
70 INJECTION, SOLUTION INTRAVENOUS CONTINUOUS
Status: CANCELLED | OUTPATIENT
Start: 2023-01-27

## 2023-01-27 RX ORDER — ONDANSETRON 2 MG/ML
4 INJECTION INTRAMUSCULAR; INTRAVENOUS EVERY 6 HOURS PRN
Status: CANCELLED | OUTPATIENT
Start: 2023-01-27

## 2023-01-27 NOTE — PROGRESS NOTES
Inpatient History and Physical Surgical Orders    Preadmission Location:   Preadmission Time:  Facility:  Surgery Date:  Surgery Time:  Preadmission Test date:     Chief Complaint  Outpatient History and Physical / Surgical Orders    Primary Care Provider: Ana Ho MD    Referring Provider: No ref. provider found    Subjective      Patient Name: Angel Velasco : 1960    HPI  The patient is a 62-year-old gentleman who was recently in the hospital.  He has a prior diagnosis of chronic hepatitis C but he has been treated and is currently undetectable in terms of his hepatitis C counts.  He has had a liver work-up that did not show significant fibrosis but did have a recent CT scan that questioned some unusual contour of the liver.  He was recently in the hospital and was noted to have some jaundice.  He was noted to have gallstones in his gallbladder but there was no evidence of cholecystitis and his liver function test did come back down to normal.  He came in today to talk about having an elective cholecystectomy.    Past History:  Medical History: has a past medical history of Allergic rhinitis due to allergen (10/24/2016), Chronic hepatitis C without hepatic coma (HCC) (10/24/2016), Chronic liver disease, Colitis, Diverticulitis, Hepatitis, Reye syndrome (HCC), and Sinus trouble.   Surgical History: has a past surgical history that includes Other surgical history (2009); Colonoscopy (,); Cystoscopy; Liver biopsy; Other surgical history (); and Colonoscopy (N/A, 10/18/2022).   Family History: family history includes Diabetes in an other family member; Heart disease in his father; Stroke in an other family member.   Social History: reports that he has never smoked. He does not have any smokeless tobacco history on file. He reports that he does not drink alcohol and does not use drugs.  Allergies: Penicillins and Doxycycline       Current Outpatient Medications:   •  fluticasone  "(FLONASE) 50 MCG/ACT nasal spray, 2 sprays into the nostril(s) as directed by provider Daily., Disp: , Rfl:   •  levocetirizine (XYZAL) 5 MG tablet, Take 5 mg by mouth Daily., Disp: , Rfl:   •  MILK THISTLE PO, Take  by mouth., Disp: , Rfl:   •  multivitamin with minerals tablet tablet, Take 1 tablet by mouth Daily., Disp: , Rfl:   •  vitamin D3 125 MCG (5000 UT) capsule capsule, Take 5,000 Units by mouth Daily., Disp: , Rfl:        Objective   Vital Signs:   Resp 14   Ht 182.9 cm (72.01\")   Wt 82 kg (180 lb 12.4 oz)   BMI 24.51 kg/m²       Physical Exam  Vitals and nursing note reviewed.   Constitutional:       Appearance: Normal appearance. The patient is well-developed.   Cardiovascular:      Rate and Rhythm: Normal rate and regular rhythm.   Pulmonary:      Effort: Pulmonary effort is normal.      Breath sounds: Normal air entry.   Abdominal:      General: Bowel sounds are normal.      Palpations: Abdomen is soft.      Skin:     General: Skin is warm and dry.   Neurological:      Mental Status: The patient is alert and oriented to person, place, and time.      Motor: Motor function is intact.   Psychiatric:         Mood and Affect: Mood normal.       Result Review :               Assessment and Plan   Diagnoses and all orders for this visit:    1. Calculus of gallbladder without cholecystitis without obstruction (Primary)    I have told him that I think it probably would be a good idea to set him up for an elective cholecystectomy.  He has a 2 cm gallstone so I think at some point that is likely to cause him some trouble in the future.  We will set him up for a robotic cholecystectomy.  I described that procedure to him as well as the risk and benefits and he is agreeable to proceeding.    I  Blayne Hoyos MD  01/27/2023    "

## 2023-02-14 NOTE — PRE-PROCEDURE INSTRUCTIONS
PATIENT INSTRUCTED TO BE:    - NPO AFTER MIDNIGHT EXCEPT CAN HAVE CLEAR LIQUIDS 2 HOURS PRIOR TO SURGERY ARRIVAL TIME     - TO HOLD ALL VITAMINS, SUPPLEMENTS, NSAIDS FOR ONE WEEK PRIOR TO THEIR SURGICAL PROCEDURE    - INSTRUCTED PT TO USE SURGICAL SOAP 1 TIME THE NIGHT PRIOR TO SURGERY OR THE AM OF SURGERY.   USE SOAP FROM NECK TO TOES AVOID THEIR FACE, HAIR, AND PRIVATE PARTS. INSTRUCTED NO LOTIONS, JEWELRY, PIERCINGS, OR DEODORANT DAY OF SURGERY    - IF DIABETIC, CHECK BLOOD GLUCOSE IF LESS THAN 70 CALL PREOP AREA -AM OF SURGERY     - INSTRUCTED TO TAKE THE FOLLOWING MEDICATIONS THE DAY OF SURGERY:       FLONASE, XYZAL    PATIENT VERBALIZED UNDERSTANDING

## 2023-02-15 ENCOUNTER — ANESTHESIA EVENT (OUTPATIENT)
Dept: PERIOP | Facility: HOSPITAL | Age: 63
End: 2023-02-15
Payer: COMMERCIAL

## 2023-02-15 ENCOUNTER — HOSPITAL ENCOUNTER (OUTPATIENT)
Facility: HOSPITAL | Age: 63
Setting detail: HOSPITAL OUTPATIENT SURGERY
Discharge: HOME | End: 2023-02-15
Attending: SURGERY | Admitting: SURGERY
Payer: COMMERCIAL

## 2023-02-15 ENCOUNTER — ANESTHESIA (OUTPATIENT)
Dept: PERIOP | Facility: HOSPITAL | Age: 63
End: 2023-02-15
Payer: COMMERCIAL

## 2023-02-15 VITALS
OXYGEN SATURATION: 100 % | RESPIRATION RATE: 16 BRPM | SYSTOLIC BLOOD PRESSURE: 152 MMHG | BODY MASS INDEX: 24.93 KG/M2 | DIASTOLIC BLOOD PRESSURE: 93 MMHG | TEMPERATURE: 97.1 F | WEIGHT: 184.08 LBS | HEIGHT: 72 IN | HEART RATE: 69 BPM

## 2023-02-15 DIAGNOSIS — K80.20 CALCULUS OF GALLBLADDER WITHOUT CHOLECYSTITIS WITHOUT OBSTRUCTION: ICD-10-CM

## 2023-02-15 PROCEDURE — 25010000002 PROPOFOL 10 MG/ML EMULSION: Performed by: NURSE ANESTHETIST, CERTIFIED REGISTERED

## 2023-02-15 PROCEDURE — 25010000002 MIDAZOLAM PER 1 MG: Performed by: ANESTHESIOLOGY

## 2023-02-15 PROCEDURE — 25010000002 FENTANYL CITRATE (PF) 50 MCG/ML SOLUTION: Performed by: NURSE ANESTHETIST, CERTIFIED REGISTERED

## 2023-02-15 PROCEDURE — 25010000002 ONDANSETRON PER 1 MG: Performed by: NURSE ANESTHETIST, CERTIFIED REGISTERED

## 2023-02-15 PROCEDURE — 25010000002 KETOROLAC TROMETHAMINE PER 15 MG: Performed by: NURSE ANESTHETIST, CERTIFIED REGISTERED

## 2023-02-15 PROCEDURE — 88304 TISSUE EXAM BY PATHOLOGIST: CPT | Performed by: SURGERY

## 2023-02-15 PROCEDURE — 25010000002 DEXAMETHASONE PER 1 MG: Performed by: NURSE ANESTHETIST, CERTIFIED REGISTERED

## 2023-02-15 PROCEDURE — 47562 LAPAROSCOPIC CHOLECYSTECTOMY: CPT | Performed by: SURGERY

## 2023-02-15 PROCEDURE — 0 HYDROMORPHONE 1 MG/ML SOLUTION: Performed by: NURSE ANESTHETIST, CERTIFIED REGISTERED

## 2023-02-15 PROCEDURE — 0 MEPERIDINE PER 100 MG: Performed by: NURSE ANESTHETIST, CERTIFIED REGISTERED

## 2023-02-15 DEVICE — CLIP LIG HEMOLOK PA LG 6CT PRP: Type: IMPLANTABLE DEVICE | Site: ABDOMEN | Status: FUNCTIONAL

## 2023-02-15 RX ORDER — MIDAZOLAM HYDROCHLORIDE 1 MG/ML
2 INJECTION INTRAMUSCULAR; INTRAVENOUS ONCE
Status: COMPLETED | OUTPATIENT
Start: 2023-02-15 | End: 2023-02-15

## 2023-02-15 RX ORDER — ONDANSETRON 4 MG/1
4 TABLET, FILM COATED ORAL ONCE AS NEEDED
Status: DISCONTINUED | OUTPATIENT
Start: 2023-02-15 | End: 2023-02-15 | Stop reason: HOSPADM

## 2023-02-15 RX ORDER — ROCURONIUM BROMIDE 10 MG/ML
INJECTION, SOLUTION INTRAVENOUS AS NEEDED
Status: DISCONTINUED | OUTPATIENT
Start: 2023-02-15 | End: 2023-02-15 | Stop reason: SURG

## 2023-02-15 RX ORDER — ONDANSETRON 2 MG/ML
4 INJECTION INTRAMUSCULAR; INTRAVENOUS ONCE AS NEEDED
Status: DISCONTINUED | OUTPATIENT
Start: 2023-02-15 | End: 2023-02-15 | Stop reason: HOSPADM

## 2023-02-15 RX ORDER — ONDANSETRON 2 MG/ML
4 INJECTION INTRAMUSCULAR; INTRAVENOUS EVERY 6 HOURS PRN
Status: DISCONTINUED | OUTPATIENT
Start: 2023-02-15 | End: 2023-02-15 | Stop reason: HOSPADM

## 2023-02-15 RX ORDER — FENTANYL CITRATE 50 UG/ML
INJECTION, SOLUTION INTRAMUSCULAR; INTRAVENOUS AS NEEDED
Status: DISCONTINUED | OUTPATIENT
Start: 2023-02-15 | End: 2023-02-15 | Stop reason: SURG

## 2023-02-15 RX ORDER — SODIUM CHLORIDE, SODIUM LACTATE, POTASSIUM CHLORIDE, CALCIUM CHLORIDE 600; 310; 30; 20 MG/100ML; MG/100ML; MG/100ML; MG/100ML
9 INJECTION, SOLUTION INTRAVENOUS CONTINUOUS PRN
Status: DISCONTINUED | OUTPATIENT
Start: 2023-02-15 | End: 2023-02-15 | Stop reason: HOSPADM

## 2023-02-15 RX ORDER — OXYCODONE HYDROCHLORIDE 5 MG/1
5 TABLET ORAL
Status: DISCONTINUED | OUTPATIENT
Start: 2023-02-15 | End: 2023-02-15 | Stop reason: HOSPADM

## 2023-02-15 RX ORDER — KETAMINE HCL IN NACL, ISO-OSM 100MG/10ML
SYRINGE (ML) INJECTION AS NEEDED
Status: DISCONTINUED | OUTPATIENT
Start: 2023-02-15 | End: 2023-02-15 | Stop reason: SURG

## 2023-02-15 RX ORDER — BUPIVACAINE HYDROCHLORIDE AND EPINEPHRINE 2.5; 5 MG/ML; UG/ML
INJECTION, SOLUTION EPIDURAL; INFILTRATION; INTRACAUDAL; PERINEURAL AS NEEDED
Status: DISCONTINUED | OUTPATIENT
Start: 2023-02-15 | End: 2023-02-15 | Stop reason: HOSPADM

## 2023-02-15 RX ORDER — HYDROCODONE BITARTRATE AND ACETAMINOPHEN 5; 325 MG/1; MG/1
1 TABLET ORAL ONCE AS NEEDED
Status: DISCONTINUED | OUTPATIENT
Start: 2023-02-15 | End: 2023-02-15 | Stop reason: HOSPADM

## 2023-02-15 RX ORDER — PROMETHAZINE HYDROCHLORIDE 12.5 MG/1
25 TABLET ORAL ONCE AS NEEDED
Status: DISCONTINUED | OUTPATIENT
Start: 2023-02-15 | End: 2023-02-15 | Stop reason: HOSPADM

## 2023-02-15 RX ORDER — PROMETHAZINE HYDROCHLORIDE 25 MG/1
25 SUPPOSITORY RECTAL ONCE AS NEEDED
Status: DISCONTINUED | OUTPATIENT
Start: 2023-02-15 | End: 2023-02-15 | Stop reason: HOSPADM

## 2023-02-15 RX ORDER — DEXAMETHASONE SODIUM PHOSPHATE 4 MG/ML
INJECTION, SOLUTION INTRA-ARTICULAR; INTRALESIONAL; INTRAMUSCULAR; INTRAVENOUS; SOFT TISSUE AS NEEDED
Status: DISCONTINUED | OUTPATIENT
Start: 2023-02-15 | End: 2023-02-15 | Stop reason: SURG

## 2023-02-15 RX ORDER — MAGNESIUM HYDROXIDE 1200 MG/15ML
LIQUID ORAL AS NEEDED
Status: DISCONTINUED | OUTPATIENT
Start: 2023-02-15 | End: 2023-02-15 | Stop reason: HOSPADM

## 2023-02-15 RX ORDER — SODIUM CHLORIDE, SODIUM LACTATE, POTASSIUM CHLORIDE, CALCIUM CHLORIDE 600; 310; 30; 20 MG/100ML; MG/100ML; MG/100ML; MG/100ML
70 INJECTION, SOLUTION INTRAVENOUS CONTINUOUS
Status: DISCONTINUED | OUTPATIENT
Start: 2023-02-15 | End: 2023-02-15 | Stop reason: HOSPADM

## 2023-02-15 RX ORDER — ACETAMINOPHEN 500 MG
1000 TABLET ORAL ONCE
Status: DISCONTINUED | OUTPATIENT
Start: 2023-02-15 | End: 2023-02-15

## 2023-02-15 RX ORDER — MEPERIDINE HYDROCHLORIDE 25 MG/ML
12.5 INJECTION INTRAMUSCULAR; INTRAVENOUS; SUBCUTANEOUS
Status: DISCONTINUED | OUTPATIENT
Start: 2023-02-15 | End: 2023-02-15 | Stop reason: HOSPADM

## 2023-02-15 RX ORDER — INDOCYANINE GREEN AND WATER 25 MG
1.25 KIT INJECTION ONCE
Status: COMPLETED | OUTPATIENT
Start: 2023-02-15 | End: 2023-02-15

## 2023-02-15 RX ORDER — LIDOCAINE HYDROCHLORIDE 20 MG/ML
INJECTION, SOLUTION EPIDURAL; INFILTRATION; INTRACAUDAL; PERINEURAL AS NEEDED
Status: DISCONTINUED | OUTPATIENT
Start: 2023-02-15 | End: 2023-02-15 | Stop reason: SURG

## 2023-02-15 RX ORDER — ONDANSETRON 2 MG/ML
INJECTION INTRAMUSCULAR; INTRAVENOUS AS NEEDED
Status: DISCONTINUED | OUTPATIENT
Start: 2023-02-15 | End: 2023-02-15 | Stop reason: SURG

## 2023-02-15 RX ORDER — KETOROLAC TROMETHAMINE 30 MG/ML
INJECTION, SOLUTION INTRAMUSCULAR; INTRAVENOUS AS NEEDED
Status: DISCONTINUED | OUTPATIENT
Start: 2023-02-15 | End: 2023-02-15 | Stop reason: SURG

## 2023-02-15 RX ORDER — PROPOFOL 10 MG/ML
VIAL (ML) INTRAVENOUS AS NEEDED
Status: DISCONTINUED | OUTPATIENT
Start: 2023-02-15 | End: 2023-02-15 | Stop reason: SURG

## 2023-02-15 RX ORDER — IBUPROFEN 600 MG/1
600 TABLET ORAL EVERY 6 HOURS PRN
Status: DISCONTINUED | OUTPATIENT
Start: 2023-02-15 | End: 2023-02-15 | Stop reason: HOSPADM

## 2023-02-15 RX ORDER — HYDROCODONE BITARTRATE AND ACETAMINOPHEN 5; 325 MG/1; MG/1
1 TABLET ORAL EVERY 4 HOURS PRN
Qty: 10 TABLET | Refills: 0 | Status: SHIPPED | OUTPATIENT
Start: 2023-02-15

## 2023-02-15 RX ORDER — GLYCOPYRROLATE 0.2 MG/ML
0.2 INJECTION INTRAMUSCULAR; INTRAVENOUS
Status: COMPLETED | OUTPATIENT
Start: 2023-02-15 | End: 2023-02-15

## 2023-02-15 RX ADMIN — ROCURONIUM BROMIDE 50 MG: 10 INJECTION, SOLUTION INTRAVENOUS at 07:52

## 2023-02-15 RX ADMIN — FENTANYL CITRATE 25 MCG: 50 INJECTION, SOLUTION INTRAMUSCULAR; INTRAVENOUS at 08:22

## 2023-02-15 RX ADMIN — SODIUM CHLORIDE, POTASSIUM CHLORIDE, SODIUM LACTATE AND CALCIUM CHLORIDE 9 ML/HR: 600; 310; 30; 20 INJECTION, SOLUTION INTRAVENOUS at 07:39

## 2023-02-15 RX ADMIN — DEXAMETHASONE SODIUM PHOSPHATE 4 MG: 4 INJECTION, SOLUTION INTRA-ARTICULAR; INTRALESIONAL; INTRAMUSCULAR; INTRAVENOUS; SOFT TISSUE at 08:06

## 2023-02-15 RX ADMIN — SUGAMMADEX 200 MG: 100 INJECTION, SOLUTION INTRAVENOUS at 09:24

## 2023-02-15 RX ADMIN — MIDAZOLAM HYDROCHLORIDE 2 MG: 1 INJECTION, SOLUTION INTRAMUSCULAR; INTRAVENOUS at 07:36

## 2023-02-15 RX ADMIN — Medication 25 MG: at 07:56

## 2023-02-15 RX ADMIN — HYDROMORPHONE HYDROCHLORIDE 1 MG: 1 INJECTION, SOLUTION INTRAMUSCULAR; INTRAVENOUS; SUBCUTANEOUS at 09:25

## 2023-02-15 RX ADMIN — INDOCYANINE GREEN AND WATER 25 MG: KIT at 07:36

## 2023-02-15 RX ADMIN — ROCURONIUM BROMIDE 20 MG: 10 INJECTION, SOLUTION INTRAVENOUS at 08:21

## 2023-02-15 RX ADMIN — FENTANYL CITRATE 25 MCG: 50 INJECTION, SOLUTION INTRAMUSCULAR; INTRAVENOUS at 08:12

## 2023-02-15 RX ADMIN — KETOROLAC TROMETHAMINE 30 MG: 30 INJECTION, SOLUTION INTRAMUSCULAR; INTRAVENOUS at 09:18

## 2023-02-15 RX ADMIN — HYDROMORPHONE HYDROCHLORIDE 1 MG: 1 INJECTION, SOLUTION INTRAMUSCULAR; INTRAVENOUS; SUBCUTANEOUS at 08:36

## 2023-02-15 RX ADMIN — Medication 25 MG: at 08:11

## 2023-02-15 RX ADMIN — GLYCOPYRROLATE 0.2 MG: 0.2 INJECTION INTRAMUSCULAR; INTRAVENOUS at 07:36

## 2023-02-15 RX ADMIN — MEPERIDINE HYDROCHLORIDE 12.5 MG: 25 INJECTION INTRAMUSCULAR; INTRAVENOUS; SUBCUTANEOUS at 10:20

## 2023-02-15 RX ADMIN — ONDANSETRON 4 MG: 2 INJECTION INTRAMUSCULAR; INTRAVENOUS at 09:18

## 2023-02-15 RX ADMIN — FENTANYL CITRATE 50 MCG: 50 INJECTION, SOLUTION INTRAMUSCULAR; INTRAVENOUS at 07:52

## 2023-02-15 RX ADMIN — LIDOCAINE HYDROCHLORIDE 60 MG: 20 INJECTION, SOLUTION EPIDURAL; INFILTRATION; INTRACAUDAL; PERINEURAL at 07:52

## 2023-02-15 RX ADMIN — PROPOFOL 150 MG: 10 INJECTION, EMULSION INTRAVENOUS at 07:52

## 2023-02-15 NOTE — OP NOTE
CHOLECYSTECTOMY LAPAROSCOPIC WITH DAVINCI ROBOT  Procedure Report    Patient Name:  Angel Velasco  YOB: 1960    Date of Surgery:  2/15/2023     Indications: The patient is a 62-year-old gentleman who presents with symptomatic gallstones.  The decision was made to proceed with a robotic cholecystectomy.    Pre-op Diagnosis: Gallstones    Post-Op Diagnosis: Same    Procedure/CPT® Codes:    Robotic cholecystectomy    Staff:  Surgeon(s):  Blayne Hoyos MD    Assistant: Yonny Alfaro    Anesthesia: General    Estimated Blood Loss: minimal    Implants:    Implant Name Type Inv. Item Serial No.  Lot No. LRB No. Used Action   CLIP LIG HEMOLOK PA LG 6CT PRP - [id number] Implant CLIP LIG HEMOLOK PA LG 6CT PRP  [employer] [id number] N/A 1 Implanted       Specimen:          Specimens     ID Source Type Tests Collected By Collected At Frozen?    A Gallbladder Tissue · TISSUE PATHOLOGY EXAM   Blayne Hoyos MD 2/15/23 0841     Description: gall bladder and contents    This specimen was not marked as sent.              Findings: Chronic cholecystitis.    Complications: None    Description of Procedure: The patient was taken the operating room and placed on the table in supine position.  After induction of general anesthesia the abdomen was prepped and draped sterilely.  The abdomen was insufflated with a Veress needle and a 5 mm right flank port was placed in the peritoneal cavity using a Visiport.  There were some omental adhesions up to the anterior abdominal wall from a prior colon surgery.  I placed one 8 mm right flank port into the peritoneal cavity under direct vision.  Then using a LigaSure dissector, I was able to take down the omental adhesions from the upper midline abdomen.  I placed a second 8 mm da Dejuan robotic trocar above the umbilicus and a 12 mm da Dejuan robotic trocar in the left upper abdomen.  The da Dejuan robot was then brought to the table and the robotic  arms were docked to our trocars.  The gallbladder was identified.  The dome of the gallbladder was grasped by the assistant and retracted cephalad.  The infundibulum was grasped and retracted laterally.  We then dissected out the gallbladder cystic duct junction and obtained a critical view of safety.  We switched the camera over to firefly mode and I clearly saw the common bile duct medially and we saw the cystic duct coming up into the infundibulum of the gallbladder.  The cystic duct was then clipped twice proximally once distally and divided with the cutting current of the cautery.  I then dissected posteriorly and dissected out the cystic artery it was clipped twice proximally with Hem-o-dar clips and then divided distally with cautery.  We then dissected the gallbladder free from the gallbladder fossa with cautery and placed in an Endopouch bag.  The operative field was irrigated out with sterile saline and suctioned dry.  We had good hemostasis.  At this point we removed the gallbladder from the abdomen and then closed the 12 mm port site with a Gold-Shantell closure device and a 0 Mersilene tie.  After desufflated the abdomen the other ports were removed.  All skin incisions were closed with 4-0 Vicryl subcuticular sutures.  Sterile dressings were applied and he was taken the postanesthesia recovery room in stable condition.    Assistant: Yonny Alfaro  was responsible for performing the following activities: Retraction, Suction, Irrigation, Placing Dressing and Held/Positioned Camera and their skilled assistance was necessary for the success of this case.    Blayne Hoyos MD     Date: 2/15/2023  Time: 09:37 EST

## 2023-02-15 NOTE — DISCHARGE INSTRUCTIONS
DISCHARGE INSTRUCTIONS LAPAROSCOPIC CHOLECYSTECTOMY/APPENDECTOMY  (GALL BLADDER)      For your surgery you had:  General anesthesia (you may have a sore throat for the first 24 hours)  IV sedation  You may experience dizziness, drowsiness, or light-headedness for several hours following surgery.  Do not stay alone tonight.  Limit your activity for 24 hours.  Resume your diet slowly.  Follow whatever special dietary instructions you may have been given by your doctor.  You should not drive, operate machinery, drink alcohol, or sign legally binding documents for 24 hours or while you are taking pain medication.  Last dose of pain medication was given at:   .    NOTIFY YOUR DOCTOR IF YOU EXPERIENCE ANY OF THE FOLLOWING:  Temperature greater than 101 degrees Fahrenheit  Shaking Chills  Redness or excessive drainage from incision  Nausea, vomiting and/or pain that is not controlled by prescribed medications  Increase in bleeding or bleeding that is excessive  Unable to urinate in 6 hours after surgery  If unable to reach your doctor, please go to the closest Emergency Room You may remove dressing  Friday .  You may shower Friday (NO TUB BATHS for 2 WEEKS) .  Apply an ice pack 24-48 hours.  You may experience gas discomfort 24-48 hours after discharge, especially in chest and shoulders.  Changing position frequently may alleviate this discomfort.  If you have excessive pain, swelling, redness, drainage or other problems, notify your physician.  If unable to urinate in 6 to 8 hours after surgery or urinating frequently in small amounts, notify your doctor or go to the nearest Emergency Room.  Medications per physician instructions as indicated on Discharge Medication Information Sheet.  You should see   for follow-up care  on  .  Phone number:      SPECIAL INSTRUCTIONS:                        I have read and received the above instructions.     Patient/Responsible Party's Signature Date/Time     JANELL  Signature Date/Time

## 2023-02-15 NOTE — ANESTHESIA PREPROCEDURE EVALUATION
" Anesthesia Evaluation     Patient summary reviewed and Nursing notes reviewed   no history of anesthetic complications:  NPO Solid Status: > 8 hours  NPO Liquid Status: > 2 hours           Airway   Mallampati: II  TM distance: >3 FB  Neck ROM: full  No difficulty expected  Dental      Pulmonary - negative pulmonary ROS and normal exam    breath sounds clear to auscultation  Cardiovascular - negative cardio ROS and normal exam  Exercise tolerance: good (4-7 METS)    ECG reviewed  Rhythm: regular  Rate: normal        Neuro/Psych- negative ROS  GI/Hepatic/Renal/Endo    (+)   hepatitis C, liver disease, renal disease stones,     ROS Comment: Crohn\"s    Musculoskeletal (-) negative ROS    Abdominal    Substance History - negative use     OB/GYN negative ob/gyn ROS         Other - negative ROS       ROS/Med Hx Other: PAT Nursing Notes unavailable.                   Anesthesia Plan    ASA 2     general     (Patient understands anesthesia not responsible for dental damage.)  intravenous induction     Anesthetic plan, risks, benefits, and alternatives have been provided, discussed and informed consent has been obtained with: patient.    Use of blood products discussed with patient .   Plan discussed with CRNA.        CODE STATUS:       "

## 2023-02-15 NOTE — ANESTHESIA POSTPROCEDURE EVALUATION
Patient: Angel Velasco    Procedure Summary     Date: 02/15/23 Room / Location: [facility]  / [facility]    Anesthesia Start: 0742 Anesthesia Stop: 0946    Procedure: CHOLECYSTECTOMY LAPAROSCOPIC WITH DAVINCI ROBOT (Abdomen) Diagnosis:       Calculus of gallbladder without cholecystitis without obstruction      (Calculus of gallbladder without cholecystitis without obstruction [K80.20])    Surgeons: Blayne Hoyos MD Provider: Maryann Qureshi MD    Anesthesia Type: general ASA Status: 2          Anesthesia Type: general    Vitals  Vitals Value Taken Time   /81 02/15/23 1059   Temp 36.2 °C (97.1 °F) 02/15/23 0942   Pulse 69 02/15/23 1106   Resp 14 02/15/23 1045   SpO2 99 % 02/15/23 1106   Vitals shown include unvalidated device data.        Post Anesthesia Care and Evaluation    Patient location during evaluation: bedside  Patient participation: complete - patient participated  Level of consciousness: awake  Pain management: adequate    Airway patency: patent  Anesthetic complications: No anesthetic complications  PONV Status: none  Cardiovascular status: acceptable and stable  Respiratory status: acceptable  Hydration status: acceptable    Comments: An Anesthesiologist personally participated in the most demanding procedures (including induction and emergence if applicable) in the anesthesia plan, monitored the course of anesthesia administration at frequent intervals and remained physically present and available for immediate diagnosis and treatment of emergencies.

## 2023-02-28 ENCOUNTER — OFFICE VISIT (OUTPATIENT)
Dept: SURGERY | Facility: CLINIC | Age: 63
End: 2023-02-28
Payer: COMMERCIAL

## 2023-02-28 VITALS — WEIGHT: 180.78 LBS | RESPIRATION RATE: 14 BRPM | HEIGHT: 72 IN | BODY MASS INDEX: 24.49 KG/M2

## 2023-02-28 DIAGNOSIS — K80.20 CALCULUS OF GALLBLADDER WITHOUT CHOLECYSTITIS WITHOUT OBSTRUCTION: Primary | ICD-10-CM

## 2023-02-28 PROCEDURE — 99024 POSTOP FOLLOW-UP VISIT: CPT | Performed by: SURGERY

## 2023-02-28 NOTE — PROGRESS NOTES
Chief Complaint  Cholelithiasis and Post-op    Subjective          Angel Velasco presents to [facility] GENERAL SURGERY  History of Present Illness    Angel Velasco is a 62 y.o. male  who presents today for a postoperative visit.     Patient is here for a follow-up after a robotic cholecystectomy.  He is doing well and had no complaints today.    Past History:  Medical History: has a past medical history of Allergic rhinitis due to allergen (10/24/2016), Cholelithiasis (?), Chronic hepatitis C without hepatic coma (HCC) (10/24/2016), Chronic liver disease, Colitis, Colon polyp (2009), Diverticulitis, Hepatitis, Kidney stones, Reye syndrome (HCC), and Sinus trouble.   Surgical History: has a past surgical history that includes Other surgical history (11/2009); Colonoscopy (2009,2012); Cystoscopy; Liver biopsy; Other surgical history (1966); Colonoscopy (N/A, 10/18/2022); Appendectomy (2009); Brain surgery (1977); cystoscopy litholapaxy bladder stone extraction; Colon surgery; and Cholecystectomy (N/A, 2/15/2023).   Family History: family history includes Diabetes in his maternal grandmother and another family member; Heart disease in his father; Stroke in an other family member.   Social History: reports that he has never smoked. He has never used smokeless tobacco. He reports that he does not drink alcohol and does not use drugs.  Allergies: Doxycycline and Penicillins       Current Outpatient Medications:   •  BOSWELLIA MELISSA PO, , Disp: , Rfl:   •  fluticasone (FLONASE) 50 MCG/ACT nasal spray, 2 sprays into the nostril(s) as directed by provider Daily., Disp: , Rfl:   •  HYDROcodone-acetaminophen (NORCO) 5-325 MG per tablet, Take 1 tablet by mouth Every 4 (Four) Hours As Needed (Pain)., Disp: 10 tablet, Rfl: 0  •  levocetirizine (XYZAL) 5 MG tablet, Take 1 tablet by mouth Daily., Disp: , Rfl:   •  MILK THISTLE PO, Take  by mouth., Disp: , Rfl:   •  multivitamin with minerals tablet  "tablet, Take 1 tablet by mouth Daily., Disp: , Rfl:   •  vitamin D3 125 MCG (5000 UT) capsule capsule, Take 1 capsule by mouth Daily., Disp: , Rfl:        Physical Exam  His incisions look good and there is no evidence of infection today.  Objective     Vital Signs:   Resp 14   Ht 182.9 cm (72.01\")   Wt 82 kg (180 lb 12.4 oz)   BMI 24.51 kg/m²              Assessment and Plan    Diagnoses and all orders for this visit:    1. Calculus of gallbladder without cholecystitis without obstruction (Primary)    I will see him back on an as-needed basis.  I have asked him to call me should he have any further problems.      "

## 2023-08-16 NOTE — H&P
[facility]  Date: 20[facility]   Patient Name: Angel Velasco  : 1960  MRN: [id number]  Primary Care Physician:  Ana Ho MD  Date of admission: 2023    Subjective   Subjective     Chief Complaint: Abdominal pain    HPI:    Angel Velasco is a 62 y.o. male past medical history of hepatitis C that presents to the emergency department for evaluation of abdominal pain.  He describes it as epigastric, sharp, intermittent, worse with eating, radiates up into the chest.  He endorses associated nausea and vomiting.  He denies any fevers, chills, sweats, shortness of breath, palpitations, diarrhea constipation, dysuria, weakness, rash.  In the emergency department work-up reveals elevated LFTs including a bilirubin greater than 5.  CT of the abdomen reveals possible cholecystitis no biliary ductal dilation.  GI was called and will evaluate the patient.  Patient also underwent MRCP subsequently which revealed gallstones but no obvious choledocholithiasis.  This also reported possible cholecystitis.  Patient has been started on Levaquin and Flagyl and will be admitted for ongoing monitoring management further GI and surgery evaluation.      Personal History     Past Medical History:  Past Medical History:   Diagnosis Date   • Allergic rhinitis due to allergen 10/24/2016    well controlled    • Chronic hepatitis C without hepatic coma (HCC) 10/24/2016   • Chronic liver disease    • Colitis    • Diverticulitis    • Elevated blood sugar    • Hepatitis    • Reye syndrome (HCC)    • Sinus trouble          Past Surgical History:  Past Surgical History:   Procedure Laterality Date   • COLONOSCOPY     • COLONOSCOPY N/A 10/18/2022    Procedure: COLONOSCOPY WITH POLYPECTOMY;  Surgeon: Niyah Durán MD;  Location: [facility] ENDOSCOPY;  Service: Gastroenterology;  Laterality: N/A;  COLON POLYPS, HEMORRHOIDS, DIVERTICULOSIS   • CYSTOSCOPY      and  ureteroscopy with stent placement   • LIVER BIOPSY     • OTHER SURGICAL HISTORY  11/2009    sigmoid colectomy    • OTHER SURGICAL HISTORY  1966    scalp surgery, excision of lesion          Family History:   Family History   Problem Relation Age of Onset   • Heart disease Father    • Stroke Other    • Diabetes Other    • Malig Hyperthermia Neg Hx          Social History:   Social History     Tobacco Use   • Smoking status: Never   Substance Use Topics   • Alcohol use: Never   • Drug use: Never         Home Medications:  Milk Thistle, fluticasone, multivitamin with minerals, and vitamin D3    Allergies:  Allergies   Allergen Reactions   • Penicillins Itching   • Doxycycline Rash       Review of Systems   All systems were reviewed and negative except for: Abdominal pain, nausea and vomiting    Objective   Objective     Vitals:   Temp:  [98.5 °F (36.9 °C)] 98.5 °F (36.9 °C)  Heart Rate:  [68-70] 70  Resp:  [17] 17  BP: (128-149)/(85-91) 136/87    Physical Exam    Constitutional: Awake, alert, no acute distress   Eyes: Pupils equal, sclerae anicteric, no conjunctival injection   HENT: NCAT, mucous membranes moist   Neck: Supple, no thyromegaly, no lymphadenopathy, trachea midline   Respiratory: Clear to auscultation bilaterally, nonlabored respirations    Cardiovascular: RRR, no murmurs, rubs, or gallops, palpable pedal pulses bilaterally   Gastrointestinal: Positive bowel sounds, soft, mildly tender worse in the epigastrium, nondistended   Musculoskeletal: No bilateral ankle edema, no clubbing or cyanosis to extremities   Psychiatric: Appropriate affect, cooperative   Neurologic: Oriented x 3, strength symmetric in all extremities, Cranial Nerves grossly intact to confrontation, speech clear   Skin: Diffuse jaundice mild    Result Review    Result Review:  I have personally reviewed the results from the time of this admission to 1/18/2023 22:50 EST and agree with these findings:  [x]  Laboratory  []  Microbiology  [x]   Radiology  []  EKG/Telemetry   []  Cardiology/Vascular   []  Pathology  []  Old records  []  Other:      Assessment & Plan   Assessment / Plan     Assessment/Plan:   Suspected cholecystitis: GI called from the emergency department due to obstructive pattern of LFTs.  MRCP ordered which reports gallstones present with possible cholecystitis.  Patient has been started on Levaquin and Flagyl.  I will also consult surgery to evaluate.  Keep patient n.p.o. with IV fluid.  Supportive care and pain control.  Serial abdominal exams and serial labs.  Chronic Hepatitis C: GI to evaluate, appreciate recommendations      DVT prophylaxis:  SCDs    CODE STATUS:    Code Status (Patient has no pulse and is not breathing): CPR (Attempt to Resuscitate)  Medical Interventions (Patient has pulse or is breathing): Full Support      Admission Status:  I believe this patient meets inpatient status.    Electronically signed by Kahlil Nicholas Jr, MD, 01/18/23, 10:50 PM EST.            16-Aug-2023 22:45

## 2023-10-03 ENCOUNTER — OFFICE VISIT (OUTPATIENT)
Dept: INTERNAL MEDICINE | Facility: CLINIC | Age: 63
End: 2023-10-03
Payer: COMMERCIAL

## 2023-10-03 VITALS
DIASTOLIC BLOOD PRESSURE: 82 MMHG | SYSTOLIC BLOOD PRESSURE: 140 MMHG | TEMPERATURE: 97.5 F | HEIGHT: 72 IN | OXYGEN SATURATION: 97 % | WEIGHT: 179 LBS | BODY MASS INDEX: 24.24 KG/M2 | HEART RATE: 53 BPM

## 2023-10-03 DIAGNOSIS — R22.1 NECK MASS: ICD-10-CM

## 2023-10-03 DIAGNOSIS — Z11.59 NEED FOR HEPATITIS B SCREENING TEST: Primary | ICD-10-CM

## 2023-10-03 LAB
HBV SURFACE AG SERPL QL IA: NORMAL
T3FREE SERPL-MCNC: 3.26 PG/ML (ref 2–4.4)
T4 FREE SERPL-MCNC: 1.35 NG/DL (ref 0.93–1.7)
TSH SERPL DL<=0.05 MIU/L-ACNC: 1.42 UIU/ML (ref 0.27–4.2)

## 2023-10-03 PROCEDURE — 84443 ASSAY THYROID STIM HORMONE: CPT | Performed by: NURSE PRACTITIONER

## 2023-10-03 PROCEDURE — 84439 ASSAY OF FREE THYROXINE: CPT | Performed by: NURSE PRACTITIONER

## 2023-10-03 PROCEDURE — 87340 HEPATITIS B SURFACE AG IA: CPT | Performed by: NURSE PRACTITIONER

## 2023-10-03 PROCEDURE — 84481 FREE ASSAY (FT-3): CPT | Performed by: NURSE PRACTITIONER

## 2023-10-03 NOTE — PROGRESS NOTES
"Chief Complaint  Thyroid Problem (Pt has concerns with his thyroid. )    Subjective        Angel Velasco [place] [facility] for concern for neck mass.  Patient reports his wife noticed a mass on his neck, and wanted to be evaluated.  Patient denies any significant symptoms, he has no fatigue, excessive tiredness, no fever, no chills, no difficulty swallowing, no neck pain.  He was concerned for possible thyroid problem.  He has a well pronounced Edgar's apple, and has always had this.  When looking straight on, it appears his Spencer apple is slightly displaced to the right.  Objective   Vital Signs:   /82   Pulse 53   Temp 97.5 °F (36.4 °C)   Ht 182.9 cm (72\")   Wt 81.2 kg (179 lb)   SpO2 97%   BMI 24.28 kg/m²     Physical Exam  Vitals and nursing note reviewed.   Constitutional:       Appearance: Normal appearance. He is normal weight.   HENT:      Head: Normocephalic and atraumatic.      Right Ear: External ear normal.      Left Ear: External ear normal.      Nose: Nose normal.      Mouth/Throat:      Mouth: Mucous membranes are moist.   Eyes:      Pupils: Pupils are equal, round, and reactive to light.   Neck:      Comments: No obvious mass palpated, there is mild displacement of the larynx to the right  Cardiovascular:      Rate and Rhythm: Normal rate.   Pulmonary:      Effort: Pulmonary effort is normal.   Musculoskeletal:      Cervical back: Normal range of motion and neck supple.   Neurological:      Mental Status: He is alert.      Result Review :  {The following data was reviewed by DERICK Aldana on 10/03/23                Diagnoses and all orders for this visit:    1. Need for hepatitis B screening test (Primary)  -     Hepatitis B surface antigen    2. Neck mass  -     TSH  -     T4, Free  -     T3, Free  -     US Thyroid    Patient with no previous lab work for thyroid, discussed that with no significant symptoms at this time, we will work-up before " doing any referrals.  I will get lab work today to assess thyroid function, previous lab work from this year reviewed.  No significant abnormalities that would be concerning.  We will go ahead and get an ultrasound of the neck to assess the thyroid, but there is no obvious mass today, just mild displacement of the larynx.  We will follow-up with patient once additional information is obtained.    Patient was concerned about his hepatitis B vaccine, unsure if he has received vaccine previously.  We will check surface antigen and determine need for vaccination.      Follow Up   No follow-ups on file.  Patient was given instructions and counseling regarding his condition or for health maintenance advice. Please see specific information pulled into the AVS if appropriate.     DERICK Aldana  10/3/2023  This note was electronically signed.

## 2023-10-18 ENCOUNTER — HOSPITAL ENCOUNTER (OUTPATIENT)
Dept: ULTRASOUND IMAGING | Facility: HOSPITAL | Age: 63
Discharge: HOME | End: 2023-10-18
Admitting: NURSE PRACTITIONER
Payer: COMMERCIAL

## 2023-10-18 PROCEDURE — 76536 US EXAM OF HEAD AND NECK: CPT

## 2024-04-08 ENCOUNTER — OFFICE VISIT (OUTPATIENT)
Dept: INTERNAL MEDICINE | Facility: CLINIC | Age: 64
End: 2024-04-08
Payer: COMMERCIAL

## 2024-04-08 VITALS
SYSTOLIC BLOOD PRESSURE: 122 MMHG | RESPIRATION RATE: 14 BRPM | WEIGHT: 181 LBS | OXYGEN SATURATION: 98 % | HEART RATE: 62 BPM | BODY MASS INDEX: 24.52 KG/M2 | HEIGHT: 72 IN | TEMPERATURE: 97.1 F | DIASTOLIC BLOOD PRESSURE: 78 MMHG

## 2024-04-08 DIAGNOSIS — Z13.29 THYROID DISORDER SCREEN: ICD-10-CM

## 2024-04-08 DIAGNOSIS — Z13.220 LIPID SCREENING: ICD-10-CM

## 2024-04-08 DIAGNOSIS — Z12.5 PROSTATE CANCER SCREENING: ICD-10-CM

## 2024-04-08 DIAGNOSIS — E55.9 VITAMIN D DEFICIENCY: ICD-10-CM

## 2024-04-08 DIAGNOSIS — Z00.00 ANNUAL PHYSICAL EXAM: Primary | ICD-10-CM

## 2024-04-08 LAB
25(OH)D3 SERPL-MCNC: 73.7 NG/ML (ref 30–100)
ALBUMIN SERPL-MCNC: 4.7 G/DL (ref 3.5–5.2)
ALBUMIN/GLOB SERPL: 2.1 G/DL
ALP SERPL-CCNC: 90 U/L (ref 39–117)
ALT SERPL W P-5'-P-CCNC: 22 U/L (ref 1–41)
ANION GAP SERPL CALCULATED.3IONS-SCNC: 10.5 MMOL/L (ref 5–15)
AST SERPL-CCNC: 23 U/L (ref 1–40)
BASOPHILS # BLD AUTO: 0.02 10*3/MM3 (ref 0–0.2)
BASOPHILS NFR BLD AUTO: 0.4 % (ref 0–1.5)
BILIRUB SERPL-MCNC: 0.7 MG/DL (ref 0–1.2)
BUN SERPL-MCNC: 15 MG/DL (ref 8–23)
BUN/CREAT SERPL: 12.6 (ref 7–25)
CALCIUM SPEC-SCNC: 9.7 MG/DL (ref 8.6–10.5)
CHLORIDE SERPL-SCNC: 105 MMOL/L (ref 98–107)
CHOLEST SERPL-MCNC: 150 MG/DL (ref 0–200)
CO2 SERPL-SCNC: 27.5 MMOL/L (ref 22–29)
CREAT SERPL-MCNC: 1.19 MG/DL (ref 0.76–1.27)
DEPRECATED RDW RBC AUTO: 39.3 FL (ref 37–54)
EGFRCR SERPLBLD CKD-EPI 2021: 69.1 ML/MIN/1.73
EOSINOPHIL # BLD AUTO: 0.22 10*3/MM3 (ref 0–0.4)
EOSINOPHIL NFR BLD AUTO: 3.9 % (ref 0.3–6.2)
ERYTHROCYTE [DISTWIDTH] IN BLOOD BY AUTOMATED COUNT: 11.9 % (ref 12.3–15.4)
GLOBULIN UR ELPH-MCNC: 2.2 GM/DL
GLUCOSE SERPL-MCNC: 101 MG/DL (ref 65–99)
HCT VFR BLD AUTO: 46.2 % (ref 37.5–51)
HDLC SERPL-MCNC: 36 MG/DL (ref 40–60)
HGB BLD-MCNC: 16.7 G/DL (ref 13–17.7)
IMM GRANULOCYTES # BLD AUTO: 0.01 10*3/MM3 (ref 0–0.05)
IMM GRANULOCYTES NFR BLD AUTO: 0.2 % (ref 0–0.5)
LDLC SERPL CALC-MCNC: 90 MG/DL (ref 0–100)
LDLC/HDLC SERPL: 2.43 {RATIO}
LYMPHOCYTES # BLD AUTO: 2.38 10*3/MM3 (ref 0.7–3.1)
LYMPHOCYTES NFR BLD AUTO: 42.5 % (ref 19.6–45.3)
MCH RBC QN AUTO: 32.6 PG (ref 26.6–33)
MCHC RBC AUTO-ENTMCNC: 36.1 G/DL (ref 31.5–35.7)
MCV RBC AUTO: 90.2 FL (ref 79–97)
MONOCYTES # BLD AUTO: 0.47 10*3/MM3 (ref 0.1–0.9)
MONOCYTES NFR BLD AUTO: 8.4 % (ref 5–12)
NEUTROPHILS NFR BLD AUTO: 2.5 10*3/MM3 (ref 1.7–7)
NEUTROPHILS NFR BLD AUTO: 44.6 % (ref 42.7–76)
NRBC BLD AUTO-RTO: 0 /100 WBC (ref 0–0.2)
PLATELET # BLD AUTO: 158 10*3/MM3 (ref 140–450)
PMV BLD AUTO: 9.9 FL (ref 6–12)
POTASSIUM SERPL-SCNC: 4.5 MMOL/L (ref 3.5–5.2)
PROT SERPL-MCNC: 6.9 G/DL (ref 6–8.5)
PSA SERPL-MCNC: 1.87 NG/ML (ref 0–4)
RBC # BLD AUTO: 5.12 10*6/MM3 (ref 4.14–5.8)
SODIUM SERPL-SCNC: 143 MMOL/L (ref 136–145)
TRIGL SERPL-MCNC: 132 MG/DL (ref 0–150)
TSH SERPL DL<=0.05 MIU/L-ACNC: 2 UIU/ML (ref 0.27–4.2)
VLDLC SERPL-MCNC: 24 MG/DL (ref 5–40)
WBC NRBC COR # BLD AUTO: 5.6 10*3/MM3 (ref 3.4–10.8)

## 2024-04-08 PROCEDURE — G0103 PSA SCREENING: HCPCS | Performed by: NURSE PRACTITIONER

## 2024-04-08 PROCEDURE — 80061 LIPID PANEL: CPT | Performed by: NURSE PRACTITIONER

## 2024-04-08 PROCEDURE — 36415 COLL VENOUS BLD VENIPUNCTURE: CPT | Performed by: NURSE PRACTITIONER

## 2024-04-08 PROCEDURE — 82306 VITAMIN D 25 HYDROXY: CPT | Performed by: NURSE PRACTITIONER

## 2024-04-08 PROCEDURE — 99396 PREV VISIT EST AGE 40-64: CPT | Performed by: NURSE PRACTITIONER

## 2024-04-08 PROCEDURE — 80050 GENERAL HEALTH PANEL: CPT | Performed by: NURSE PRACTITIONER

## 2024-04-08 NOTE — PROGRESS NOTES
"Chief Complaint  Annual Exam    Subjective        Angel Velasco [place] [facility] for annual checkup.  Patient reports no major changes.  Has had some allergies, but doing well overall.    Objective   Vital Signs:   /78 (BP Location: Left arm, Patient Position: Sitting, Cuff Size: Adult)   Pulse 62   Temp 97.1 °F (36.2 °C) (Temporal)   Resp 14   Ht 182 cm (71.65\")   Wt 82.1 kg (181 lb)   SpO2 98%   BMI 24.79 kg/m²     Physical Exam  Vitals and nursing note reviewed.   Constitutional:       Appearance: Normal appearance. He is normal weight.   HENT:      Head: Normocephalic and atraumatic.      Right Ear: Tympanic membrane, ear canal and external ear normal.      Left Ear: Tympanic membrane, ear canal and external ear normal.      Nose: Nose normal.      Mouth/Throat:      Mouth: Mucous membranes are moist.      Pharynx: Oropharynx is clear.   Eyes:      Conjunctiva/sclera: Conjunctivae normal.      Pupils: Pupils are equal, round, and reactive to light.   Cardiovascular:      Rate and Rhythm: Normal rate and regular rhythm.      Pulses: Normal pulses.      Heart sounds: Normal heart sounds.   Pulmonary:      Effort: Pulmonary effort is normal.      Breath sounds: Normal breath sounds.   Musculoskeletal:      Cervical back: Normal range of motion and neck supple.   Neurological:      Mental Status: He is alert.   Psychiatric:         Mood and Affect: Mood normal.         Thought Content: Thought content normal.        Result Review :  {The following data was reviewed by DERICK Aldana on 04/08/24                Diagnoses and all orders for this visit:    1. Annual physical exam (Primary)  Assessment & Plan:  Screening labs reviewed/ordered  Counseling provided regarding age appropriate screenings and immunizations, healthy diet and exercise.       Orders:  -     Comprehensive Metabolic Panel  -     CBC & Differential  -     TSH  -     Lipid Panel    2. Lipid " screening  -     Lipid Panel    3. Thyroid disorder screen  -     TSH    4. Prostate cancer screening  -     PSA Screen    5. Vitamin D deficiency  -     Vitamin D,25-Hydroxy          Follow Up   Return in about 1 year (around 4/8/2025) for Annual physical.  Patient was given instructions and counseling regarding his condition or for health maintenance advice. Please see specific information pulled into the AVS if appropriate.     Thomas Boyd, DERICK  4/8/2024  This note was electronically signed.

## 2025-03-12 ENCOUNTER — OFFICE VISIT (OUTPATIENT)
Dept: INTERNAL MEDICINE | Facility: CLINIC | Age: 65
End: 2025-03-12
Payer: COMMERCIAL

## 2025-03-12 VITALS
TEMPERATURE: 97.2 F | BODY MASS INDEX: 24.62 KG/M2 | SYSTOLIC BLOOD PRESSURE: 132 MMHG | HEART RATE: 78 BPM | OXYGEN SATURATION: 97 % | RESPIRATION RATE: 18 BRPM | HEIGHT: 72 IN | WEIGHT: 181.8 LBS | DIASTOLIC BLOOD PRESSURE: 80 MMHG

## 2025-03-12 DIAGNOSIS — E55.9 VITAMIN D DEFICIENCY: ICD-10-CM

## 2025-03-12 DIAGNOSIS — R19.7 DIARRHEA, UNSPECIFIED TYPE: Primary | ICD-10-CM

## 2025-03-12 DIAGNOSIS — R10.84 GENERALIZED ABDOMINAL PAIN: ICD-10-CM

## 2025-03-12 LAB
ALBUMIN SERPL-MCNC: 4.1 G/DL (ref 3.5–5.2)
ALBUMIN/GLOB SERPL: 1.2 G/DL
ALP SERPL-CCNC: 89 U/L (ref 39–117)
ALT SERPL W P-5'-P-CCNC: 26 U/L (ref 1–41)
ANION GAP SERPL CALCULATED.3IONS-SCNC: 11.4 MMOL/L (ref 5–15)
AST SERPL-CCNC: 31 U/L (ref 1–40)
BASOPHILS # BLD AUTO: 0.02 10*3/MM3 (ref 0–0.2)
BASOPHILS NFR BLD AUTO: 0.3 % (ref 0–1.5)
BILIRUB SERPL-MCNC: 0.6 MG/DL (ref 0–1.2)
BUN SERPL-MCNC: 14 MG/DL (ref 8–23)
BUN/CREAT SERPL: 12.7 (ref 7–25)
CALCIUM SPEC-SCNC: 9.6 MG/DL (ref 8.6–10.5)
CHLORIDE SERPL-SCNC: 102 MMOL/L (ref 98–107)
CHOLEST SERPL-MCNC: 104 MG/DL (ref 0–200)
CO2 SERPL-SCNC: 25.6 MMOL/L (ref 22–29)
CREAT SERPL-MCNC: 1.1 MG/DL (ref 0.76–1.27)
DEPRECATED RDW RBC AUTO: 37.9 FL (ref 37–54)
EGFRCR SERPLBLD CKD-EPI 2021: 75.9 ML/MIN/1.73
EOSINOPHIL # BLD AUTO: 0.1 10*3/MM3 (ref 0–0.4)
EOSINOPHIL NFR BLD AUTO: 1.7 % (ref 0.3–6.2)
ERYTHROCYTE [DISTWIDTH] IN BLOOD BY AUTOMATED COUNT: 11.7 % (ref 12.3–15.4)
EXPIRATION DATE: NORMAL
FLUAV AG UPPER RESP QL IA.RAPID: NOT DETECTED
FLUBV AG UPPER RESP QL IA.RAPID: NOT DETECTED
GLOBULIN UR ELPH-MCNC: 3.4 GM/DL
GLUCOSE SERPL-MCNC: 90 MG/DL (ref 65–99)
HCT VFR BLD AUTO: 44.3 % (ref 37.5–51)
HDLC SERPL-MCNC: 35 MG/DL (ref 40–60)
HGB BLD-MCNC: 15.6 G/DL (ref 13–17.7)
IMM GRANULOCYTES # BLD AUTO: 0.01 10*3/MM3 (ref 0–0.05)
IMM GRANULOCYTES NFR BLD AUTO: 0.2 % (ref 0–0.5)
INTERNAL CONTROL: NORMAL
LDLC SERPL CALC-MCNC: 51 MG/DL (ref 0–100)
LDLC/HDLC SERPL: 1.46 {RATIO}
LIPASE SERPL-CCNC: 18 U/L (ref 13–60)
LYMPHOCYTES # BLD AUTO: 2.01 10*3/MM3 (ref 0.7–3.1)
LYMPHOCYTES NFR BLD AUTO: 35.1 % (ref 19.6–45.3)
Lab: NORMAL
MCH RBC QN AUTO: 31.7 PG (ref 26.6–33)
MCHC RBC AUTO-ENTMCNC: 35.2 G/DL (ref 31.5–35.7)
MCV RBC AUTO: 90 FL (ref 79–97)
MONOCYTES # BLD AUTO: 0.87 10*3/MM3 (ref 0.1–0.9)
MONOCYTES NFR BLD AUTO: 15.2 % (ref 5–12)
NEUTROPHILS NFR BLD AUTO: 2.72 10*3/MM3 (ref 1.7–7)
NEUTROPHILS NFR BLD AUTO: 47.5 % (ref 42.7–76)
NRBC BLD AUTO-RTO: 0 /100 WBC (ref 0–0.2)
PLATELET # BLD AUTO: 178 10*3/MM3 (ref 140–450)
PMV BLD AUTO: 9.8 FL (ref 6–12)
POTASSIUM SERPL-SCNC: 4.6 MMOL/L (ref 3.5–5.2)
PROT SERPL-MCNC: 7.5 G/DL (ref 6–8.5)
RBC # BLD AUTO: 4.92 10*6/MM3 (ref 4.14–5.8)
SARS-COV-2 AG UPPER RESP QL IA.RAPID: NOT DETECTED
SODIUM SERPL-SCNC: 139 MMOL/L (ref 136–145)
TRIGL SERPL-MCNC: 90 MG/DL (ref 0–150)
TSH SERPL DL<=0.05 MIU/L-ACNC: 3.28 UIU/ML (ref 0.27–4.2)
VLDLC SERPL-MCNC: 18 MG/DL (ref 5–40)
WBC NRBC COR # BLD AUTO: 5.73 10*3/MM3 (ref 3.4–10.8)

## 2025-03-12 PROCEDURE — 83690 ASSAY OF LIPASE: CPT | Performed by: INTERNAL MEDICINE

## 2025-03-12 PROCEDURE — 80061 LIPID PANEL: CPT | Performed by: INTERNAL MEDICINE

## 2025-03-12 PROCEDURE — 80050 GENERAL HEALTH PANEL: CPT | Performed by: INTERNAL MEDICINE

## 2025-03-12 NOTE — PROGRESS NOTES
"Chief Complaint  Diarrhea (Urgent care follow up, diarrhea going on for about 1 week, negative for all illness on 3/11. Will be leaving in 2 weeks for trip and would like this issue resolved. ) and Chills (Chills seems to be worse at night)      Subjective      History of Present Illness  The patient presents for evaluation of diarrhea.    He has experienced intermittent diarrhea and constipation for the past week. On Monday, he had diarrhea with a sensation of coldness, 3 bowel movements on Monday, and 4 on Tuesday. He sought urgent care but was told no intervention was needed unless he passed blood. Abdominal pain prior to these episodes subsided after passing gas last night. He reports bloating, decreased appetite (2 small meals yesterday, smoothie for lunch today), weakness, and attempts to maintain hydration. No nausea or vomiting, but burping without regurgitation. History of diverticulitis and past colon resection.         Objective   Vital Signs:   Vitals:    03/12/25 1407   BP: 132/80   BP Location: Left arm   Patient Position: Sitting   Cuff Size: Adult   Pulse: 78   Resp: 18   Temp: 97.2 °F (36.2 °C)   TempSrc: Temporal   SpO2: 97%   Weight: 82.5 kg (181 lb 12.8 oz)   Height: 182 cm (71.65\")     Body mass index is 24.9 kg/m².    Wt Readings from Last 3 Encounters:   03/12/25 82.5 kg (181 lb 12.8 oz)   03/11/25 83.4 kg (183 lb 12.8 oz)   04/08/24 82.1 kg (181 lb)     BP Readings from Last 3 Encounters:   03/12/25 132/80   03/11/25 137/91   04/08/24 122/78       Health Maintenance   Topic Date Due    Pneumococcal Vaccine 50+ (1 of 1 - PCV) Never done    ANNUAL PHYSICAL  04/08/2025    TDAP/TD VACCINES (2 - Td or Tdap) 11/10/2030    COLORECTAL CANCER SCREENING  10/18/2032    HEPATITIS C SCREENING  Completed    COVID-19 Vaccine  Completed    INFLUENZA VACCINE  Completed    ZOSTER VACCINE  Completed    Hepatitis B  Discontinued       Physical Exam  Vitals reviewed.   Constitutional:       Appearance: Normal " appearance. He is well-developed.   HENT:      Head: Normocephalic and atraumatic.      Right Ear: External ear normal.      Left Ear: External ear normal.   Eyes:      Conjunctiva/sclera: Conjunctivae normal.      Pupils: Pupils are equal, round, and reactive to light.   Cardiovascular:      Rate and Rhythm: Normal rate and regular rhythm.      Heart sounds: No murmur heard.     No friction rub. No gallop.   Pulmonary:      Effort: Pulmonary effort is normal.      Breath sounds: Normal breath sounds. No wheezing or rhonchi.   Abdominal:      Palpations: Abdomen is soft.      Tenderness: There is abdominal tenderness.      Comments: Hyperactive bowel sounds   Skin:     General: Skin is warm and dry.   Neurological:      Mental Status: He is alert and oriented to person, place, and time.   Psychiatric:         Mood and Affect: Affect normal.         Behavior: Behavior normal.         Thought Content: Thought content normal.        Physical Exam        Result Review :  The following data was reviewed by: Ana Ho MD on 03/12/2025:         Results      BMI is within normal parameters. No other follow-up for BMI required.       Procedures            Assessment & Plan  Diarrhea, unspecified type    Orders:    POCT SARS-CoV-2 + Flu Antigen WINSOME    CT Abdomen Pelvis With & Without Contrast; Future    Comprehensive Metabolic Panel    CBC & Differential    TSH    Lipid Panel    Lipase; Future    XR Abdomen KUB; Future    Stool Culture (Reference Lab) - Stool, Per Rectum; Future    Clostridioides difficile Toxin, PCR - Stool, Per Rectum; Future    Giardia Antigen - Stool, Per Rectum; Future    Generalized abdominal pain    Orders:    CT Abdomen Pelvis With & Without Contrast; Future    Comprehensive Metabolic Panel    CBC & Differential    TSH    Lipid Panel    Lipase; Future    XR Abdomen KUB; Future    Stool Culture (Reference Lab) - Stool, Per Rectum; Future    Clostridioides difficile Toxin, PCR - Stool, Per  Rectum; Future    Giardia Antigen - Stool, Per Rectum; Future    Vitamin D deficiency              Assessment & Plan  Diarrhea  - Concern for complications given history  - Symptoms may indicate mild norovirus infection causing significant diarrhea and occasional nausea  - No specific treatment  - Advised to maintain hydration  - Avoid Lomotil  - Use Imodium sparingly if necessary  - Comprehensive lab workup including CT abdomen, x-ray, and stool studies  - Provided stool collection kit  - Consider endoscopic examination if no improvement    Patient or patient representative verbalized consent for the use of Ambient Listening during the visit with  Ana Ho MD for chart documentation. 3/12/2025  22:38 EDT      FOLLOW UP  Return in about 2 months (around 5/12/2025).  Patient was given instructions and counseling regarding his condition or for health maintenance advice. Please see specific information pulled into the AVS if appropriate.     Ana Ho MD  03/12/25  15:26 EDT    CURRENT & DISCONTINUED MEDICATIONS  Current Outpatient Medications   Medication Instructions    BOSWELLIA MELISSA PO No dose, route, or frequency recorded.    fluticasone (FLONASE) 50 MCG/ACT nasal spray 2 sprays, Daily    levocetirizine (XYZAL) 5 mg, Daily    MILK THISTLE PO Take  by mouth.    multivitamin with minerals tablet tablet 1 tablet, Daily    vitamin D3 5,000 Units, Daily       There are no discontinued medications.

## 2025-03-13 ENCOUNTER — HOSPITAL ENCOUNTER (OUTPATIENT)
Dept: CT IMAGING | Facility: HOSPITAL | Age: 65
Discharge: HOME | End: 2025-03-13
Admitting: INTERNAL MEDICINE
Payer: COMMERCIAL

## 2025-03-13 DIAGNOSIS — R10.84 GENERALIZED ABDOMINAL PAIN: ICD-10-CM

## 2025-03-13 DIAGNOSIS — R19.7 DIARRHEA, UNSPECIFIED TYPE: ICD-10-CM

## 2025-03-13 PROCEDURE — 87046 STOOL CULTR AEROBIC BACT EA: CPT | Performed by: INTERNAL MEDICINE

## 2025-03-13 PROCEDURE — 87493 C DIFF AMPLIFIED PROBE: CPT | Performed by: INTERNAL MEDICINE

## 2025-03-13 PROCEDURE — 74177 CT ABD & PELVIS W/CONTRAST: CPT

## 2025-03-13 PROCEDURE — 87329 GIARDIA AG IA: CPT | Performed by: INTERNAL MEDICINE

## 2025-03-13 PROCEDURE — 25510000001 IOPAMIDOL PER 1 ML: Performed by: INTERNAL MEDICINE

## 2025-03-13 PROCEDURE — 87427 SHIGA-LIKE TOXIN AG IA: CPT | Performed by: INTERNAL MEDICINE

## 2025-03-13 PROCEDURE — 87045 FECES CULTURE AEROBIC BACT: CPT | Performed by: INTERNAL MEDICINE

## 2025-03-13 RX ORDER — IOPAMIDOL 755 MG/ML
100 INJECTION, SOLUTION INTRAVASCULAR
Status: COMPLETED | OUTPATIENT
Start: 2025-03-13 | End: 2025-03-13

## 2025-03-13 RX ADMIN — IOPAMIDOL 100 ML: 755 INJECTION, SOLUTION INTRAVENOUS at 12:12

## 2025-03-14 LAB
C DIFF TOX GENS STL QL NAA+PROBE: NEGATIVE
G LAMBLIA AG STL QL IA: NEGATIVE

## 2025-03-17 LAB
BACTERIA SPEC CULT: NORMAL
BACTERIA SPEC CULT: NORMAL
CAMPYLOBACTER STL CULT: NORMAL
E COLI SXT STL QL IA: NEGATIVE
SALM + SHIG STL CULT: NORMAL

## 2025-05-19 ENCOUNTER — OFFICE VISIT (OUTPATIENT)
Dept: INTERNAL MEDICINE | Facility: CLINIC | Age: 65
End: 2025-05-19
Payer: COMMERCIAL

## 2025-05-19 VITALS
OXYGEN SATURATION: 97 % | TEMPERATURE: 98.1 F | RESPIRATION RATE: 18 BRPM | SYSTOLIC BLOOD PRESSURE: 120 MMHG | HEART RATE: 60 BPM | WEIGHT: 188.8 LBS | BODY MASS INDEX: 25.57 KG/M2 | HEIGHT: 72 IN | DIASTOLIC BLOOD PRESSURE: 72 MMHG

## 2025-05-19 DIAGNOSIS — R10.13 ABDOMINAL PAIN, ACUTE, EPIGASTRIC: ICD-10-CM

## 2025-05-19 DIAGNOSIS — Z23 NEED FOR VACCINATION: ICD-10-CM

## 2025-05-19 DIAGNOSIS — Z12.5 SCREENING PSA (PROSTATE SPECIFIC ANTIGEN): ICD-10-CM

## 2025-05-19 DIAGNOSIS — Z00.00 ANNUAL PHYSICAL EXAM: Primary | ICD-10-CM

## 2025-05-19 DIAGNOSIS — Z13.220 SCREENING, LIPID: ICD-10-CM

## 2025-05-19 DIAGNOSIS — E55.9 VITAMIN D DEFICIENCY: ICD-10-CM

## 2025-05-19 DIAGNOSIS — J30.9 ALLERGIC RHINITIS, UNSPECIFIED SEASONALITY, UNSPECIFIED TRIGGER: ICD-10-CM

## 2025-05-19 LAB
25(OH)D3 SERPL-MCNC: 59.9 NG/ML (ref 30–100)
ALBUMIN SERPL-MCNC: 4.9 G/DL (ref 3.5–5.2)
ALBUMIN/GLOB SERPL: 2 G/DL
ALP SERPL-CCNC: 90 U/L (ref 39–117)
ALT SERPL W P-5'-P-CCNC: 23 U/L (ref 1–41)
ANION GAP SERPL CALCULATED.3IONS-SCNC: 10.9 MMOL/L (ref 5–15)
AST SERPL-CCNC: 26 U/L (ref 1–40)
BASOPHILS # BLD AUTO: 0.04 10*3/MM3 (ref 0–0.2)
BASOPHILS NFR BLD AUTO: 0.7 % (ref 0–1.5)
BILIRUB SERPL-MCNC: 0.8 MG/DL (ref 0–1.2)
BUN SERPL-MCNC: 13 MG/DL (ref 8–23)
BUN/CREAT SERPL: 12.7 (ref 7–25)
CALCIUM SPEC-SCNC: 10 MG/DL (ref 8.6–10.5)
CHLORIDE SERPL-SCNC: 103 MMOL/L (ref 98–107)
CHOLEST SERPL-MCNC: 143 MG/DL (ref 0–200)
CO2 SERPL-SCNC: 27.1 MMOL/L (ref 22–29)
CREAT SERPL-MCNC: 1.02 MG/DL (ref 0.76–1.27)
DEPRECATED RDW RBC AUTO: 41.3 FL (ref 37–54)
EGFRCR SERPLBLD CKD-EPI 2021: 82.6 ML/MIN/1.73
EOSINOPHIL # BLD AUTO: 0.2 10*3/MM3 (ref 0–0.4)
EOSINOPHIL NFR BLD AUTO: 3.4 % (ref 0.3–6.2)
ERYTHROCYTE [DISTWIDTH] IN BLOOD BY AUTOMATED COUNT: 12.7 % (ref 12.3–15.4)
GLOBULIN UR ELPH-MCNC: 2.5 GM/DL
GLUCOSE SERPL-MCNC: 99 MG/DL (ref 65–99)
HBA1C MFR BLD: 5.1 % (ref 4.8–5.6)
HCT VFR BLD AUTO: 46.4 % (ref 37.5–51)
HDLC SERPL-MCNC: 43 MG/DL (ref 40–60)
HGB BLD-MCNC: 16.7 G/DL (ref 13–17.7)
IMM GRANULOCYTES # BLD AUTO: 0.01 10*3/MM3 (ref 0–0.05)
IMM GRANULOCYTES NFR BLD AUTO: 0.2 % (ref 0–0.5)
LDLC SERPL CALC-MCNC: 76 MG/DL (ref 0–100)
LDLC/HDLC SERPL: 1.68 {RATIO}
LYMPHOCYTES # BLD AUTO: 2.47 10*3/MM3 (ref 0.7–3.1)
LYMPHOCYTES NFR BLD AUTO: 41.9 % (ref 19.6–45.3)
MCH RBC QN AUTO: 32.6 PG (ref 26.6–33)
MCHC RBC AUTO-ENTMCNC: 36 G/DL (ref 31.5–35.7)
MCV RBC AUTO: 90.4 FL (ref 79–97)
MONOCYTES # BLD AUTO: 0.43 10*3/MM3 (ref 0.1–0.9)
MONOCYTES NFR BLD AUTO: 7.3 % (ref 5–12)
NEUTROPHILS NFR BLD AUTO: 2.75 10*3/MM3 (ref 1.7–7)
NEUTROPHILS NFR BLD AUTO: 46.5 % (ref 42.7–76)
NRBC BLD AUTO-RTO: 0 /100 WBC (ref 0–0.2)
PLATELET # BLD AUTO: 181 10*3/MM3 (ref 140–450)
PMV BLD AUTO: 9.8 FL (ref 6–12)
POTASSIUM SERPL-SCNC: 5 MMOL/L (ref 3.5–5.2)
PROT SERPL-MCNC: 7.4 G/DL (ref 6–8.5)
PSA SERPL-MCNC: 2.26 NG/ML (ref 0–4)
RBC # BLD AUTO: 5.13 10*6/MM3 (ref 4.14–5.8)
SODIUM SERPL-SCNC: 141 MMOL/L (ref 136–145)
TRIGL SERPL-MCNC: 139 MG/DL (ref 0–150)
VLDLC SERPL-MCNC: 24 MG/DL (ref 5–40)
WBC NRBC COR # BLD AUTO: 5.9 10*3/MM3 (ref 3.4–10.8)

## 2025-05-19 PROCEDURE — 85025 COMPLETE CBC W/AUTO DIFF WBC: CPT | Performed by: INTERNAL MEDICINE

## 2025-05-19 PROCEDURE — G0103 PSA SCREENING: HCPCS | Performed by: INTERNAL MEDICINE

## 2025-05-19 PROCEDURE — 80061 LIPID PANEL: CPT | Performed by: INTERNAL MEDICINE

## 2025-05-19 PROCEDURE — 82306 VITAMIN D 25 HYDROXY: CPT | Performed by: INTERNAL MEDICINE

## 2025-05-19 PROCEDURE — 80053 COMPREHEN METABOLIC PANEL: CPT | Performed by: INTERNAL MEDICINE

## 2025-05-19 PROCEDURE — 83036 HEMOGLOBIN GLYCOSYLATED A1C: CPT | Performed by: INTERNAL MEDICINE

## 2025-05-19 NOTE — PROGRESS NOTES
"Chief Complaint  Annual Exam and requesting labs for insurance      Subjective      History of Present Illness  The patient presents for a physical exam.    Diarrhea resolved since 03/2025. No chest pain, respiratory distress, or lower extremity edema. Mental health stable. Weight gain of 9 pounds attributed to a recent cruise. On daily vitamin D and K supplements and liver medication; liver enzymes normal for several years. No significant abdominal discomfort. Allergy medications effective.    Reports unspecified arm issue, possibly related to an incident with his dog.  Just lasting a few days at this point.    PAST SURGICAL HISTORY: Colonoscopy in 2022.         Objective   Vital Signs:   Vitals:    05/19/25 0933   BP: 120/72   BP Location: Right arm   Patient Position: Sitting   Cuff Size: Adult   Pulse: 60   Resp: 18   Temp: 98.1 °F (36.7 °C)   TempSrc: Temporal   SpO2: 97%   Weight: 85.6 kg (188 lb 12.8 oz)   Height: 182 cm (71.65\")     Body mass index is 25.85 kg/m².    Wt Readings from Last 3 Encounters:   05/19/25 85.6 kg (188 lb 12.8 oz)   03/12/25 82.5 kg (181 lb 12.8 oz)   03/11/25 83.4 kg (183 lb 12.8 oz)     BP Readings from Last 3 Encounters:   05/19/25 120/72   03/12/25 132/80   03/11/25 137/91       Health Maintenance   Topic Date Due    ANNUAL PHYSICAL  04/08/2025    INFLUENZA VACCINE  07/01/2025    COLORECTAL CANCER SCREENING  10/18/2027    TDAP/TD VACCINES (2 - Td or Tdap) 11/10/2030    HEPATITIS C SCREENING  Completed    COVID-19 Vaccine  Completed    Pneumococcal Vaccine 50+  Completed    ZOSTER VACCINE  Completed    Hepatitis B  Discontinued       Physical Exam  Vitals reviewed.   Constitutional:       Appearance: Normal appearance. He is well-developed.   HENT:      Head: Normocephalic and atraumatic.      Right Ear: External ear normal.      Left Ear: External ear normal.   Eyes:      Conjunctiva/sclera: Conjunctivae normal.      Pupils: Pupils are equal, round, and reactive to light. "   Cardiovascular:      Rate and Rhythm: Normal rate and regular rhythm.      Heart sounds: No murmur heard.     No friction rub. No gallop.   Pulmonary:      Effort: Pulmonary effort is normal.      Breath sounds: Normal breath sounds. No wheezing or rhonchi.   Skin:     General: Skin is warm and dry.   Neurological:      Mental Status: He is alert and oriented to person, place, and time.   Psychiatric:         Mood and Affect: Affect normal.         Behavior: Behavior normal.         Thought Content: Thought content normal.        Physical Exam        Result Review :  The following data was reviewed by: Ana Ho MD on 05/19/2025:         Results             Procedures            Assessment & Plan  Annual physical exam         Need for vaccination    Orders:    Pneumococcal Conjugate Vaccine 20-Valent All    Screening, lipid    Orders:    Lipid Panel    Screening PSA (prostate specific antigen)    Orders:    PSA Screen    Abdominal pain, acute, epigastric    Orders:    Comprehensive Metabolic Panel    CBC & Differential    Hemoglobin A1c; Future    Vitamin D deficiency    Orders:    Vitamin D 25 hydroxy; Future    Allergic rhinitis, unspecified seasonality, unspecified trigger              Assessment & Plan  Health maintenance  - Weight within normal range; advised to maintain below 195 pounds  - Colonoscopy scheduled in 2 years  - Ordered comprehensive blood work panel: CBC, CMP, cholesterol, PSA, vitamin D  - Administer pneumonia vaccine today  -very healthy cont eating well and being active  -UTD on colonoscopy    Arm pain  - Muscle pain in arm, possibly due to pulling on dog's leash  - Monitor condition, use ibuprofen or Voltaren gel as needed  - Inform clinic if pain persists    Follow-up in 1 year    Patient or patient representative verbalized consent for the use of Ambient Listening during the visit with  Ana Ho MD for chart documentation. 5/19/2025  09:55 EDT      FOLLOW UP  No  follow-ups on file.  Patient was given instructions and counseling regarding his condition or for health maintenance advice. Please see specific information pulled into the AVS if appropriate.     Ana Ho MD  05/19/25  11:10 EDT    CURRENT & DISCONTINUED MEDICATIONS  Current Outpatient Medications   Medication Instructions    BOSWELLIA MELISSA PO No dose, route, or frequency recorded.    fluticasone (FLONASE) 50 MCG/ACT nasal spray 2 sprays, Daily    levocetirizine (XYZAL) 5 mg, Daily    MILK THISTLE PO Take  by mouth.    multivitamin with minerals tablet tablet 1 tablet, Daily    vitamin D3 5,000 Units, Daily       There are no discontinued medications.

## 2025-05-19 NOTE — LETTER
[facility]  Vaccine Consent Form    Patient Name:  Angel Velasco  Patient :  1960     Vaccine(s) Ordered    Pneumococcal Conjugate Vaccine 20-Valent All        Screening Checklist  The following questions should be completed prior to vaccination. If you answer “yes” to any question, it does not necessarily mean you should not be vaccinated. It just means we may need to clarify or ask more questions. If a question is unclear, please ask your healthcare provider to explain it.    Yes No   Any fever or moderate to severe illness today (mild illness and/or antibiotic treatment are not contraindications)?     Do you have a history of a serious reaction to any previous vaccinations, such as anaphylaxis, encephalopathy within 7 days, Guillain-Indianola syndrome within 6 weeks, seizure?     Have you received any live vaccine(s) (e.g MMR, DARLINE) or any other vaccines in the last month (to ensure duplicate doses aren't given)?     Do you have an anaphylactic allergy to latex (DTaP, DTaP-IPV, Hep A, Hep B, MenB, RV, Td, Tdap), baker’s yeast (Hep B, HPV), polysorbates (RSV, nirsevimab, PCV 20, Rotavirrus, Tdap, Shingrix), or gelatin (DARLINE, MMR)?     Do you have an anaphylactic allergy to neomycin (Rabies, DARLINE, MMR, IPV, Hep A), polymyxin B (IPV), or streptomycin (IPV)?      Any cancer, leukemia, AIDS, or other immune system disorder? (DARLINE, MMR, RV)     Do you have a parent, brother, or sister with an immune system problem (if immune competence of vaccine recipient clinically verified, can proceed)? (MMR, DARLINE)     Any recent steroid treatments for >2 weeks, chemotherapy, or radiation treatment? (DARLINE, MMR)     Have you received antibody-containing blood transfusions or IVIG in the past 11 months (recommended interval is dependent on product)? (MMR, DARLINE)     Have you taken antiviral drugs (acyclovir, famciclovir, valacyclovir for DARLINE) in the last 24 or 48 hours, respectively?      Are you pregnant or planning to become  "pregnant within 1 month? (DARLINE, MMR, HPV, IPV, MenB, Abrexvy; For Hep B- refer to Engerix-B; For RSV - Abrysvo is indicated for 32-36 weeks of pregnancy from September to January)     For infants, have you ever been told your child has had intussusception or a medical emergency involving obstruction of the intestine (Rotavirus)? If not for an infant, can skip this question.         *Ordering Physicians/APC should be consulted if \"yes\" is checked by the patient or guardian above.  I have received, read, and understand the Vaccine Information Statement (VIS) for each vaccine ordered.  I have considered my or my child's health status as well as the health status of my close contacts.  I have taken the opportunity to discuss my vaccine questions with my or my child's health care provider.   I have requested that the ordered vaccine(s) be given to me or my child.  I understand the benefits and risks of the vaccines.  I understand that I should remain in the clinic for 15 minutes after receiving the vaccine(s).  _________________________________________________________  Signature of Patient or Parent/Legal Guardian ____________________  Date     "

## 2025-06-27 ENCOUNTER — TELEPHONE (OUTPATIENT)
Dept: GASTROENTEROLOGY | Facility: CLINIC | Age: 65
End: 2025-06-27
Payer: COMMERCIAL

## 2025-06-27 NOTE — TELEPHONE ENCOUNTER
Spoke with patient regarding scheduling him a 3 year colon recall. He has scheduled a phone screening on 8/11/25@10:30

## 2025-06-27 NOTE — TELEPHONE ENCOUNTER
Caller: Angel Velasco    Relationship to patient: Self    Best call back number: [phone]    Chief complaint: SCHED  PROCEDURE    Type of visit: C-SCOPE PER LETTER    Requested date:       If rescheduling, when is the original appointment:       Additional notes:

## 2025-08-11 ENCOUNTER — PREP FOR SURGERY (OUTPATIENT)
Dept: OTHER | Facility: HOSPITAL | Age: 65
End: 2025-08-11
Payer: COMMERCIAL

## 2025-08-11 ENCOUNTER — CLINICAL SUPPORT (OUTPATIENT)
Dept: GASTROENTEROLOGY | Facility: CLINIC | Age: 65
End: 2025-08-11
Payer: COMMERCIAL

## 2025-08-11 DIAGNOSIS — Z86.0100 PERSONAL HISTORY OF COLON POLYPS, UNSPECIFIED: ICD-10-CM

## 2025-08-11 DIAGNOSIS — Z12.11 COLON CANCER SCREENING: Primary | ICD-10-CM

## 2025-08-11 RX ORDER — SODIUM, POTASSIUM,MAG SULFATES 17.5-3.13G
SOLUTION, RECONSTITUTED, ORAL ORAL
Qty: 354 ML | Refills: 0 | Status: SHIPPED | OUTPATIENT
Start: 2025-08-11

## (undated) DEVICE — APPL CHLORAPREP HI/LITE 26ML ORNG

## (undated) DEVICE — SUT MERSILENE POLYSTR CT1 BR 0 75CM GRN

## (undated) DEVICE — CONN JET HYDRA H20 AUXILIARY DISP

## (undated) DEVICE — NON-WOVEN ADHESIVE WOUND DRESSING: Brand: PRIMAPORE ADHESIVE DRESSING 10*8CM

## (undated) DEVICE — ARM DRAPE

## (undated) DEVICE — GLV SURG SENSICARE PI ORTHO SZ8 LF STRL

## (undated) DEVICE — SYR LL TP 10ML STRL

## (undated) DEVICE — NON-WOVEN ADHESIVE WOUND DRESSING: Brand: PRIMAPORE ADHESIVE WOUND DRSG 7.2*5CM

## (undated) DEVICE — SOL IRR NACL 0.9PCT 3000ML

## (undated) DEVICE — SEAL

## (undated) DEVICE — CANNULA SEAL

## (undated) DEVICE — DAVINCI-LF: Brand: MEDLINE INDUSTRIES, INC.

## (undated) DEVICE — SOLIDIFIER LIQLOC PLS 1500CC BT

## (undated) DEVICE — ENDOPATH XCEL WITH OPTIVIEW TECHNOLOGY BLADELESS TROCARS WITH STABILITY SLEEVES: Brand: ENDOPATH XCEL OPTIVIEW

## (undated) DEVICE — DRSNG WND GZ CURAD OIL EMULSION 3X3IN STRL

## (undated) DEVICE — SOL IRRG H2O PL/BG 1000ML STRL

## (undated) DEVICE — SLV SCD KN/LEN ADJ EXPRSS BLENDED MD 1P/U

## (undated) DEVICE — INTENDED FOR TISSUE SEPARATION, AND OTHER PROCEDURES THAT REQUIRE A SHARP SURGICAL BLADE TO PUNCTURE OR CUT.: Brand: BARD-PARKER ® CARBON RIB-BACK BLADES

## (undated) DEVICE — STRIP CLS WND SUTURESTRIP/PLS 0.5X4IN TP1103

## (undated) DEVICE — Device: Brand: DEFENDO AIR/WATER/SUCTION AND BIOPSY VALVE

## (undated) DEVICE — SNAR POLYP CAPTIFLEX XS/OVL 11X2.4MM 240CM 1P/U

## (undated) DEVICE — LAPAROVUE VISIBILITY SYSTEM LAPAROSCOPIC SOLUTIONS: Brand: LAPAROVUE

## (undated) DEVICE — TISSUE RETRIEVAL SYSTEM: Brand: INZII RETRIEVAL SYSTEM

## (undated) DEVICE — LINER SURG CANSTR SXN S/RIGD 1500CC

## (undated) DEVICE — COVER,LIGHT HANDLE,FLX,1/PK: Brand: MEDLINE INDUSTRIES, INC.

## (undated) DEVICE — ANTIBACTERIAL UNDYED BRAIDED (POLYGLACTIN 910), SYNTHETIC ABSORBABLE SUTURE: Brand: COATED VICRYL

## (undated) DEVICE — BLUNT TIP LAPAROSCOPIC SEALER/DIVIDER NANO-COATED: Brand: LIGASURE

## (undated) DEVICE — PENCL E/S HNDSWCH ROCKR CB

## (undated) DEVICE — GOWN IMPERV OPN/BK KNT/CUF XXL

## (undated) DEVICE — Device

## (undated) DEVICE — THE SINGLE USE ETRAP – POLYP TRAP IS USED FOR SUCTION RETRIEVAL OF ENDOSCOPICALLY REMOVED POLYPS.: Brand: ETRAP

## (undated) DEVICE — REDUCER: Brand: ENDOWRIST

## (undated) DEVICE — STERILE POLYISOPRENE POWDER-FREE SURGICAL GLOVES WITH EMOLLIENT COATING: Brand: PROTEXIS

## (undated) DEVICE — SYS CLOSE PORTII CARTR/THOMASN XL